# Patient Record
Sex: FEMALE | Race: ASIAN | NOT HISPANIC OR LATINO | Employment: FULL TIME | URBAN - METROPOLITAN AREA
[De-identification: names, ages, dates, MRNs, and addresses within clinical notes are randomized per-mention and may not be internally consistent; named-entity substitution may affect disease eponyms.]

---

## 2017-11-28 ENCOUNTER — OFFICE VISIT (OUTPATIENT)
Dept: PRIMARY CARE CLINIC | Facility: CLINIC | Age: 42
End: 2017-11-28
Payer: COMMERCIAL

## 2017-11-28 VITALS
HEART RATE: 78 BPM | DIASTOLIC BLOOD PRESSURE: 64 MMHG | WEIGHT: 112 LBS | HEIGHT: 60 IN | TEMPERATURE: 99 F | SYSTOLIC BLOOD PRESSURE: 118 MMHG | BODY MASS INDEX: 21.99 KG/M2

## 2017-11-28 DIAGNOSIS — R50.9 FEVER, LOW GRADE: ICD-10-CM

## 2017-11-28 DIAGNOSIS — R10.32 LEFT LOWER QUADRANT PAIN: ICD-10-CM

## 2017-11-28 DIAGNOSIS — R10.2 PELVIC PAIN IN FEMALE: Primary | ICD-10-CM

## 2017-11-28 PROCEDURE — 99203 OFFICE O/P NEW LOW 30 MIN: CPT | Mod: S$GLB,,, | Performed by: NURSE PRACTITIONER

## 2017-11-28 PROCEDURE — 99999 PR PBB SHADOW E&M-NEW PATIENT-LVL IV: CPT | Mod: PBBFAC,,, | Performed by: NURSE PRACTITIONER

## 2017-11-28 RX ORDER — MULTIVITAMIN
1 TABLET ORAL DAILY
COMMUNITY

## 2017-11-28 RX ORDER — ACETAMINOPHEN AND PHENYLEPHRINE HCL 325; 5 MG/1; MG/1
TABLET ORAL DAILY
COMMUNITY

## 2017-11-28 NOTE — PROGRESS NOTES
Subjective:       Patient ID: Yulisa Chandler is a 42 y.o. female.    Chief Complaint: Pelvic Pain (left week x 1 week)    Pelvic Pain   The patient's primary symptoms include pelvic pain (left lower ). The patient's pertinent negatives include no genital itching, genital lesions, genital odor, genital rash, missed menses, vaginal bleeding or vaginal discharge. This is a new problem. The current episode started 1 to 4 weeks ago. The problem occurs constantly. The problem has been gradually worsening. The pain is moderate. The problem affects the left side. She is not pregnant. Associated symptoms include abdominal pain (left lower quadrant abd and pelvis), a fever (low grade ) and frequency. Pertinent negatives include no anorexia, back pain, chills, constipation, diarrhea, discolored urine, dysuria, flank pain, headaches, hematuria, joint pain, joint swelling, nausea, painful intercourse, rash, sore throat, urgency or vomiting. The symptoms are aggravated by bowel movements. She has tried nothing for the symptoms. The treatment provided no relief. She is sexually active. No, her partner does not have an STD. She uses tubal ligation for contraception. Her menstrual history has been regular. Her past medical history is significant for a  section and miscarriage. There is no history of an ectopic pregnancy, endometriosis, a gynecological surgery, herpes simplex, menorrhagia, metrorrhagia, ovarian cysts, perineal abscess, an STD or vaginosis.     Review of Systems   Constitutional: Positive for fever (low grade ). Negative for chills, diaphoresis and fatigue.   HENT: Negative for sore throat.    Respiratory: Negative for cough, choking and shortness of breath.    Cardiovascular: Negative for chest pain and leg swelling.   Gastrointestinal: Positive for abdominal pain (left lower quadrant abd and pelvis). Negative for abdominal distention, anal bleeding, anorexia, blood in stool, constipation, diarrhea, nausea,  rectal pain and vomiting.   Genitourinary: Positive for frequency and pelvic pain (left lower ). Negative for decreased urine volume, difficulty urinating, dyspareunia, dysuria, flank pain, hematuria, menorrhagia, menstrual problem, missed menses, urgency, vaginal bleeding, vaginal discharge and vaginal pain.   Musculoskeletal: Negative for back pain and joint pain.   Skin: Negative for pallor and rash.   Neurological: Negative for headaches.   Hematological: Negative for adenopathy.   All other systems reviewed and are negative.      Objective:      Physical Exam   Constitutional: She is oriented to person, place, and time. She appears well-developed and well-nourished. No distress.   HENT:   Head: Normocephalic and atraumatic.   Mouth/Throat: Oropharynx is clear and moist.   Eyes: Conjunctivae are normal. Pupils are equal, round, and reactive to light. Right eye exhibits no discharge. Left eye exhibits no discharge. No scleral icterus.   Neck: Normal range of motion. Neck supple. No JVD present.   Cardiovascular: Normal rate, regular rhythm, normal heart sounds and intact distal pulses.    Pulmonary/Chest: Effort normal and breath sounds normal. No respiratory distress. She has no wheezes.   Abdominal: Soft. Bowel sounds are normal. She exhibits no distension and no mass. There is tenderness (c/o mild lower left quadrant pain with palpation ). There is no rebound and no guarding.   Musculoskeletal: Normal range of motion. She exhibits no edema.   Lymphadenopathy:     She has no cervical adenopathy.   Neurological: She is alert and oriented to person, place, and time.   Skin: Skin is warm and dry. Capillary refill takes less than 2 seconds. She is not diaphoretic.   Psychiatric: She has a normal mood and affect. Her behavior is normal.   Nursing note and vitals reviewed.      Assessment:       1. Pelvic pain in female    2. Left lower quadrant pain    3. Fever, low grade        Plan:       Pelvic pain in female  -      US Pelvis Complete Non OB; Future; Expected date: 11/28/2017  -     US Abdomen Complete; Future; Expected date: 11/28/2017  -     CBC auto differential; Future; Expected date: 11/28/2017  -     Comprehensive metabolic panel; Future; Expected date: 11/28/2017  -     POCT Urinalysis  -     POCT urine pregnancy    Left lower quadrant pain  -     US Pelvis Complete Non OB; Future; Expected date: 11/28/2017  -     US Abdomen Complete; Future; Expected date: 11/28/2017  -     CBC auto differential; Future; Expected date: 11/28/2017  -     Comprehensive metabolic panel; Future; Expected date: 11/28/2017  -     POCT Urinalysis  -     POCT urine pregnancy    Fever, low grade  -     US Pelvis Complete Non OB; Future; Expected date: 11/28/2017  -     US Abdomen Complete; Future; Expected date: 11/28/2017  -     CBC auto differential; Future; Expected date: 11/28/2017  -     Comprehensive metabolic panel; Future; Expected date: 11/28/2017    Differentials include ovarian cyst, constipation, and diverticular disease.     Medication List with Changes/Refills   Current Medications    BIOTIN 10,000 MCG CAP    Take by mouth once daily.    MULTIVITAMIN (ONE DAILY MULTIVITAMIN) PER TABLET    Take 1 tablet by mouth once daily.       Patient is to continue OTC NSAID as per label instructions for pain/fever.  She is to undergo labs and US and we will call her with results.  More recommendations based upon results.  If needed we will send her back to her GYN for further evaluation.  Encouraged her to increase fluids and rest today.  She will f/u in the next 1-2 weeks to undergo her annual physical.      I have reviewed the patient's past medical/surgical and social histories and updated as appropriate.    Plan of care was reviewed and agreed upon with the patient.  An opportunity to ask questions was provided and explanation given. Patient verbalized understanding on all information reviewed and discussed.  The patient will follow up  at her scheduled appointment to establish as primary care patient or sooner if needed. If symptoms worsen patient may call for ASAP appointment or report to the emergency department for further evaluation.

## 2017-11-28 NOTE — PATIENT INSTRUCTIONS
Pelvic Pain, Uncertain Cause    Pelvic pain is pain felt in the lowest part of the belly (abdomen) and between the hipbones. The pain may be acute. This means it occurred suddenly and recently. Or the pain may be chronic. This means it has occurred for 6 months or longer.  There are many possible causes of pelvic pain. The pain may be due to a problem in the female reproductive system (pictured here). Or, it may be due to a problem in the digestive, urinary, or musculoskeletal systems.  Based on your visit today, the exact cause of your pelvic pain is not certain. Your condition does not appear to be serious at this time. But it is important for you to keep watching for any new symptoms or worsening of your condition.  General care  Your healthcare provider may advise a number of ways to help manage your pain. These can include:  · Taking over-the-counter pain medicine. Stronger pain medicine may also be prescribed, if needed.  · Applying heat to the pelvic area. Use a heating pad or a hot pack. Taking a hot bath may also help.  · Getting plenty of rest.  · Making certain lifestyle changes. These can include practicing good posture and getting regular exercise. (Studies have shown that these changes help reduce pelvic pain in some women.)  · Seeing a physical therapist or pain specialist. These healthcare providers can discuss other ways to manage pain with you.  Follow-up care  Follow up with your healthcare provider, or as advised.   When to seek medical advice  Call your healthcare provider right away if any of the following occur:  · Fever of 100.4°F or higher, or as directed by your healthcare provider  · Pain worsens or you have sudden, severe pain or new pain  · Nausea, vomiting, sweating, or restlessness  · Dizziness or fainting  · Unusual vaginal discharge  · Abnormal vaginal bleeding (especially bleeding after menopause)  Date Last Reviewed: 6/11/2015  © 0843-3647 [employer]. [address] [place] [address]  [place] [address]. All rights reserved. This information is not intended as a substitute for professional medical care. Always follow your healthcare professional's instructions.

## 2017-11-29 ENCOUNTER — HOSPITAL ENCOUNTER (OUTPATIENT)
Dept: RADIOLOGY | Facility: HOSPITAL | Age: 42
Discharge: HOME | End: 2017-11-29
Attending: NURSE PRACTITIONER
Payer: COMMERCIAL

## 2017-11-29 DIAGNOSIS — R10.32 LEFT LOWER QUADRANT PAIN: ICD-10-CM

## 2017-11-29 DIAGNOSIS — R50.9 FEVER, LOW GRADE: ICD-10-CM

## 2017-11-29 DIAGNOSIS — R10.2 PELVIC PAIN IN FEMALE: ICD-10-CM

## 2017-11-29 PROCEDURE — 76700 US EXAM ABDOM COMPLETE: CPT | Mod: 26,,, | Performed by: RADIOLOGY

## 2017-11-29 PROCEDURE — 76856 US EXAM PELVIC COMPLETE: CPT | Mod: 26,,, | Performed by: RADIOLOGY

## 2017-11-29 PROCEDURE — 76856 US EXAM PELVIC COMPLETE: CPT | Mod: TC

## 2017-11-29 PROCEDURE — 76830 TRANSVAGINAL US NON-OB: CPT | Mod: 26,,, | Performed by: RADIOLOGY

## 2017-11-29 PROCEDURE — 76700 US EXAM ABDOM COMPLETE: CPT | Mod: TC

## 2017-11-30 ENCOUNTER — DOCUMENTATION ONLY (OUTPATIENT)
Dept: PRIMARY CARE CLINIC | Facility: CLINIC | Age: 42
End: 2017-11-30

## 2017-11-30 ENCOUNTER — TELEPHONE (OUTPATIENT)
Dept: PRIMARY CARE CLINIC | Facility: CLINIC | Age: 42
End: 2017-11-30

## 2017-11-30 NOTE — PROGRESS NOTES
Discussed US results with patient this morning.  Patient reports pain has decreased and at this time she is not experiencing any pain.  She did obtain Aleve and took it as directed with moderate benefit.  She did not undergo labs as ordered and would like to come to clinic for her annual exam and at that time have labs taken.  For now, we will continue monitor patient pain symptoms.  Pain likely memittelschmerz.

## 2019-01-25 DIAGNOSIS — Z12.39 BREAST CANCER SCREENING: ICD-10-CM

## 2019-01-29 ENCOUNTER — OFFICE VISIT (OUTPATIENT)
Dept: PRIMARY CARE CLINIC | Facility: CLINIC | Age: 44
End: 2019-01-29
Payer: COMMERCIAL

## 2019-01-29 VITALS
TEMPERATURE: 99 F | DIASTOLIC BLOOD PRESSURE: 74 MMHG | WEIGHT: 108.81 LBS | HEIGHT: 60 IN | BODY MASS INDEX: 21.36 KG/M2 | HEART RATE: 77 BPM | RESPIRATION RATE: 20 BRPM | OXYGEN SATURATION: 99 % | SYSTOLIC BLOOD PRESSURE: 114 MMHG

## 2019-01-29 DIAGNOSIS — Z23 NEED FOR TDAP VACCINATION: ICD-10-CM

## 2019-01-29 DIAGNOSIS — L65.9 THINNING HAIR: ICD-10-CM

## 2019-01-29 DIAGNOSIS — Z00.00 ANNUAL PHYSICAL EXAM: Primary | ICD-10-CM

## 2019-01-29 DIAGNOSIS — Z13.220 SCREENING FOR LIPID DISORDERS: ICD-10-CM

## 2019-01-29 DIAGNOSIS — Z23 NEED FOR PROPHYLACTIC VACCINATION AND INOCULATION AGAINST INFLUENZA: ICD-10-CM

## 2019-01-29 PROCEDURE — 99396 PR PREVENTIVE VISIT,EST,40-64: ICD-10-PCS | Mod: 25,S$GLB,, | Performed by: NURSE PRACTITIONER

## 2019-01-29 PROCEDURE — 90715 TDAP VACCINE 7 YRS/> IM: CPT | Mod: S$GLB,,, | Performed by: NURSE PRACTITIONER

## 2019-01-29 PROCEDURE — 90472 TDAP VACCINE GREATER THAN OR EQUAL TO 7YO IM: ICD-10-PCS | Mod: S$GLB,,, | Performed by: NURSE PRACTITIONER

## 2019-01-29 PROCEDURE — 99999 PR PBB SHADOW E&M-EST. PATIENT-LVL IV: ICD-10-PCS | Mod: PBBFAC,,, | Performed by: NURSE PRACTITIONER

## 2019-01-29 PROCEDURE — 90471 FLU VACCINE (QUAD) GREATER THAN OR EQUAL TO 3YO PRESERVATIVE FREE IM: ICD-10-PCS | Mod: S$GLB,,, | Performed by: NURSE PRACTITIONER

## 2019-01-29 PROCEDURE — 99999 PR PBB SHADOW E&M-EST. PATIENT-LVL IV: CPT | Mod: PBBFAC,,, | Performed by: NURSE PRACTITIONER

## 2019-01-29 PROCEDURE — 90472 IMMUNIZATION ADMIN EACH ADD: CPT | Mod: S$GLB,,, | Performed by: NURSE PRACTITIONER

## 2019-01-29 PROCEDURE — 90715 TDAP VACCINE GREATER THAN OR EQUAL TO 7YO IM: ICD-10-PCS | Mod: S$GLB,,, | Performed by: NURSE PRACTITIONER

## 2019-01-29 PROCEDURE — 90471 IMMUNIZATION ADMIN: CPT | Mod: S$GLB,,, | Performed by: NURSE PRACTITIONER

## 2019-01-29 PROCEDURE — 90686 IIV4 VACC NO PRSV 0.5 ML IM: CPT | Mod: S$GLB,,, | Performed by: NURSE PRACTITIONER

## 2019-01-29 PROCEDURE — 90686 FLU VACCINE (QUAD) GREATER THAN OR EQUAL TO 3YO PRESERVATIVE FREE IM: ICD-10-PCS | Mod: S$GLB,,, | Performed by: NURSE PRACTITIONER

## 2019-01-29 PROCEDURE — 99396 PREV VISIT EST AGE 40-64: CPT | Mod: 25,S$GLB,, | Performed by: NURSE PRACTITIONER

## 2019-01-29 NOTE — PROGRESS NOTES
Pt ID verified by  and Name. Allergies verified with pt. Pt reports has had flu vaccine in the past and denies any reactions. Influenza 0.5mL vaccine administered IM to R Deltoid and Tdap 0.5mL vaccine administered IM to L Deltoid. Pt tolerated well. No adverse reactions noted.

## 2019-01-29 NOTE — PROGRESS NOTES
Subjective:       Patient ID: Yulisa Chandler is a 43 y.o. female.    Chief Complaint: Annual Exam    Patient presents to clinic today for annual physical exam. She has no acute complaints.  She is due for labs and mammogram.  She is due for pap. She is followed by Dr. Karma Stallworth, GYN in [place].  She does attend routine eye and dental exams.  She stays active with 4 children and tries to eat healthy.  She works for [employer].  She does take biotin for ongoing hair thinning since the birth of her children.  She is due for immunization updating.  She does perform self breast exam at home.        Review of Systems   Constitutional: Negative.    HENT: Negative.    Eyes: Negative.    Respiratory: Negative.    Cardiovascular: Negative.    Gastrointestinal: Negative.    Endocrine: Negative.    Genitourinary: Negative.    Musculoskeletal: Negative.    Skin: Negative.    Allergic/Immunologic: Negative.    Neurological: Negative.    Hematological: Negative.    Psychiatric/Behavioral: Negative.    All other systems reviewed and are negative.      Objective:      Physical Exam   Constitutional: She is oriented to person, place, and time. She appears well-developed and well-nourished. No distress.   HENT:   Head: Normocephalic and atraumatic.   Right Ear: External ear normal.   Left Ear: External ear normal.   Nose: Nose normal.   Mouth/Throat: Oropharynx is clear and moist. No oropharyngeal exudate.   Eyes: Conjunctivae and EOM are normal. Pupils are equal, round, and reactive to light. Right eye exhibits no discharge. Left eye exhibits no discharge. No scleral icterus.   Neck: Normal range of motion. Neck supple. No thyromegaly present.   Cardiovascular: Normal rate, regular rhythm, normal heart sounds and intact distal pulses. Exam reveals no gallop and no friction rub.   No murmur heard.  Pulmonary/Chest: Effort normal and breath sounds normal. No respiratory distress.   Abdominal: Soft. Bowel sounds are normal.  She exhibits no distension. There is no tenderness.   Musculoskeletal: Normal range of motion. She exhibits no edema.   Lymphadenopathy:     She has no cervical adenopathy.   Neurological: She is alert and oriented to person, place, and time. No cranial nerve deficit. Coordination normal.   Skin: Skin is warm and dry. Capillary refill takes less than 2 seconds. No rash noted. She is not diaphoretic. No erythema.   Psychiatric: She has a normal mood and affect. Her behavior is normal. Thought content normal.   Nursing note and vitals reviewed.      Assessment:       1. Annual physical exam    2. Screening for lipid disorders    3. Thinning hair    4. Need for prophylactic vaccination and inoculation against influenza    5. Need for Tdap vaccination        Plan:       Annual physical exam  -     CBC auto differential; Future; Expected date: 01/29/2019  -     Comprehensive metabolic panel; Future; Expected date: 01/29/2019  -     Lipid panel; Future; Expected date: 01/29/2019  -     TSH; Future; Expected date: 01/29/2019  -     Urinalysis; Future; Expected date: 01/29/2019  -     (In Office Administered) Tdap Vaccine  -     Influenza - Quadrivalent (3 years & older) (PF)    Screening for lipid disorders  -     CBC auto differential; Future; Expected date: 01/29/2019  -     Comprehensive metabolic panel; Future; Expected date: 01/29/2019  -     Lipid panel; Future; Expected date: 01/29/2019  -     TSH; Future; Expected date: 01/29/2019  -     Urinalysis; Future; Expected date: 01/29/2019    Thinning hair  -     CBC auto differential; Future; Expected date: 01/29/2019  -     Comprehensive metabolic panel; Future; Expected date: 01/29/2019  -     Lipid panel; Future; Expected date: 01/29/2019  -     TSH; Future; Expected date: 01/29/2019  -     Urinalysis; Future; Expected date: 01/29/2019    Need for prophylactic vaccination and inoculation against influenza  -     Cancel: Flu Vaccine - Quadrivalent (PF) (3 years &  older)  -     Influenza - Quadrivalent (3 years & older) (PF)    Need for Tdap vaccination  -     (In Office Administered) Tdap Vaccine         Medication List           Accurate as of 1/29/19 11:52 AM. If you have any questions, ask your nurse or doctor.               CONTINUE taking these medications    biotin 10,000 mcg Cap     ONE DAILY MULTIVITAMIN per tablet  Generic drug:  multivitamin              I have reviewed the patient's past medical/surgical and social histories and updated as appropriate. Medications were reviewed and discussed as appropriate including side effects and risks versus benefit. Plan of care was reviewed and agreed upon with the patient.  An opportunity to ask questions was provided and explanation given. Patient verbalized understanding on all information reviewed and discussed. If any abnormal labs we will call her with them.  She is to follow up as needed and in one year for annual exam.

## 2019-01-29 NOTE — PATIENT INSTRUCTIONS
Adult Immunization Schedule  Vaccine How Often Disease Prevented Who Needs It   Influenza Every year Flu. This can be especially dangerous to elderly adults or people with immune disorders. All adults   Tetanus, diphtheria (Td); or Tetanus, diphtheria, and pertussis (Tdap)* One dose of Tdap, then one dose of Td as a booster every 10 years Tetanus (lockjaw), a disease that causes muscles to spasm  Diphtheria, an infection that causes fever, weakness, and breathing problems  Pertussis, also known as whooping cough. This is a highly contagious disease that can cause serious illness. All adults  *This vaccine should be given during each pregnancy no matter how many years since the last vaccine. The vaccine increases protection for your . A  is too young to get the vaccine, but at the highest risk for severe illness and death from pertussis.   Varicella (Kelvin)** One series of 2 injections Chickenpox. This is a disease that causes itchy skin bumps, fever, and tiredness. It can lead to scarring, pneumonia, or brain inflammation. Adults who dont have evidence of immunity  **This vaccine should not be given to pregnant women. Women should avoid pregnancy for 4 weeks after the vaccine.   Human papillomavirus (HPV) One series of 3 injections Cervical cancer, caused by certain types of HPV  Vaginal and vulvar cancer  Penile cancer  Head and neck cancers  Anal cancer  Genital warts Females and males ages 15 to 26  Ask your healthcare provider if this vaccine is right for you.   Zoster--- 1 dose Herpes zoster (shingles), a painful rash marked by blisters Adults ages 60 and older.  ---You should not get this vaccine if your immune system is low. For example, if you have HIV, are taking medicines that suppress your immune system, or are getting cancer treatment.   Measles, mumps, rubella (MMR)** 1 or 2 doses, for ages 19 through 49; 1 dose for ages 50 and older if at risk Measles, a disease marked by red spots,  fever, and coughing  Mumps, a disease that causes swelling in the salivary glands. It may affect the ovaries or testes.  Rubella ([place] measles). This is a form of measles that can cause birth defects if a pregnant woman catches it. Adults born in 1957 or later who are not known to be immune to all 3 of these diseases. Ask your healthcare provider if you need a second dose.  **This vaccine should not be given to pregnant women. Women should avoid pregnancy for 4 weeks after vaccination.   Pneumococcal (PCV 13) 1 dose Pneumonia. This is an infection that causes inflammation in your lungs. It can lead to death. Adults ages 65 and older. Also, adults ages 19 and older with weak immune systems or any of these health conditions: chronic renal failure, nephrotic syndrome, functional or anatomic asplenia, cerebrospinal fluid (CSF) leaks, or cochlear implants. This vaccine adds extra protection to PCV 23 and should be given about 2 months before PCV 23.   Pneumococcal (PPSV23)  1 or 2 doses Pneumonia. This is an infection that causes inflammation in your lungs. It can lead to death. Adults ages 65 and older. Also, adults with chronic illnesses, such as asthma, COPD, heart disease, diabetes, liver disease, alcoholism, sickle cell disease, or history of splenectomy. In addition, adults with an immune disorder and adults who smoke cigarettes. This vaccine is recommended for all adults regardless of immune status.   Meningococcal  (MCV or MPSV) 1 or more doses Meningococcal disease (bacterial meningitis). This is inflammation of the membrane covering the brain and spinal cord. It can lead to death. Adults with immune deficiencies or high risk of exposure. Also, college freshmen living in dormitories and  recruits.   Hepatitis A (HepA) One series of 2 injections Hepatitis A. This is an infection that can result in acute liver inflammation and yellow skin and whites of the eyes (jaundice). Adults with risk factors, such  as clotting disorders or chronic liver disease, and adults with high risk of exposure. This includes men who have sex with men, IV drug users, and travelers to countries where hepatitis A is common.   Hepatitis B (HepB) One series of 3 injections Hepatitis B. This is an infection that causes chronic, severe liver disease. Adults with high risk of exposure, such as healthcare providers and sanitation workers, and adults with diabetes   Travelers diseases As needed Infections such as cholera, typhoid, yellow fever, polio, rabies, meningococcal disease, hepatitis A, hepatitis B Adults traveling out of the country. Required vaccines will vary, depending on the country you visit. Check the CDC website: www.cdc.gov.    ,  Based on the CDC National Immunization Program recommendations for adults.  Date Last Reviewed: 2/1/2017  © 6229-9472 [employer]. [address] [place] [address], [place] [address]. All rights reserved. This information is not intended as a substitute for professional medical care. Always follow your healthcare professional's instructions.

## 2019-04-25 ENCOUNTER — TELEPHONE (OUTPATIENT)
Dept: ADMINISTRATIVE | Facility: HOSPITAL | Age: 44
End: 2019-04-25

## 2019-04-25 NOTE — LETTER
April 25, 2019    Dr. Olivia Snow and staff              [facility]   [address] [address]  P: [phone]  F: [phone] April 25, 2019     Patient: Yulisa Chandler    YOB: 1975   Date of Visit: 4/25/2019         [facility]    We are seeing Yulisa Chandler in the clinic today at [facility].  Luisa Stovall, ENMANUEL, NP-C is their PCP.  She/He has an outstanding lab/procedure at this time when reviewing their chart.  To help with our Health Maintenance records will you please supply the following:      [x]  Mammogram                          []  Colonoscopy   [x]  Pap Smear                             [x]  Outside Lab Results   []  Dexa scans                            []  Eye Exam   []  Foot Exam                             [] Other___________   []  Outside Immunizations                                               Please Fax to [facility] at [phone].    Thank you for your help, BENEDICT Kellogg.  If I can be of any assistance you can call at [phone]

## 2019-04-25 NOTE — TELEPHONE ENCOUNTER
Placed call to patient to discuss overdue HM. Patient advised that she has not had a chance to have a recent PAP Smear and Mammogram. EFAX sent to Dr. Stallworth to obtain most recent records.

## 2019-05-06 PROBLEM — Z00.00 ANNUAL PHYSICAL EXAM: Status: RESOLVED | Noted: 2019-01-29 | Resolved: 2019-05-06

## 2019-08-26 ENCOUNTER — OFFICE VISIT (OUTPATIENT)
Dept: PRIMARY CARE CLINIC | Facility: CLINIC | Age: 44
End: 2019-08-26
Attending: NURSE PRACTITIONER
Payer: COMMERCIAL

## 2019-08-26 VITALS
RESPIRATION RATE: 18 BRPM | DIASTOLIC BLOOD PRESSURE: 59 MMHG | WEIGHT: 109.56 LBS | BODY MASS INDEX: 21.51 KG/M2 | SYSTOLIC BLOOD PRESSURE: 116 MMHG | HEART RATE: 89 BPM | HEIGHT: 60 IN | OXYGEN SATURATION: 99 % | TEMPERATURE: 99 F

## 2019-08-26 DIAGNOSIS — R10.9 FLANK PAIN: ICD-10-CM

## 2019-08-26 DIAGNOSIS — M54.50 ACUTE BILATERAL LOW BACK PAIN WITHOUT SCIATICA: Primary | ICD-10-CM

## 2019-08-26 DIAGNOSIS — K59.00 CONSTIPATION, UNSPECIFIED CONSTIPATION TYPE: ICD-10-CM

## 2019-08-26 DIAGNOSIS — R31.9 HEMATURIA, UNSPECIFIED TYPE: ICD-10-CM

## 2019-08-26 DIAGNOSIS — R14.0 ABDOMINAL BLOATING: ICD-10-CM

## 2019-08-26 LAB
B-HCG UR QL: NEGATIVE
BILIRUB SERPL-MCNC: 0 MG/DL
BLOOD, POC UA: NORMAL
CTP QC/QA: YES
GLUCOSE UR QL STRIP: NORMAL
KETONES UR QL STRIP: 0
LEUKOCYTE ESTERASE URINE, POC: 0
NITRITE, POC UA: 0
PH, POC UA: 5
PROTEIN, POC: 0
SPECIFIC GRAVITY, POC UA: 1.01
UROBILINOGEN, POC UA: NORMAL

## 2019-08-26 PROCEDURE — 99999 PR PBB SHADOW E&M-EST. PATIENT-LVL IV: CPT | Mod: PBBFAC,,, | Performed by: NURSE PRACTITIONER

## 2019-08-26 PROCEDURE — 99214 PR OFFICE/OUTPT VISIT, EST, LEVL IV, 30-39 MIN: ICD-10-PCS | Mod: 25,S$GLB,, | Performed by: NURSE PRACTITIONER

## 2019-08-26 PROCEDURE — 81000 POCT URINALYSIS: ICD-10-PCS | Mod: S$GLB,,, | Performed by: NURSE PRACTITIONER

## 2019-08-26 PROCEDURE — 3008F PR BODY MASS INDEX (BMI) DOCUMENTED: ICD-10-PCS | Mod: CPTII,S$GLB,, | Performed by: NURSE PRACTITIONER

## 2019-08-26 PROCEDURE — 81000 URINALYSIS NONAUTO W/SCOPE: CPT | Mod: S$GLB,,, | Performed by: NURSE PRACTITIONER

## 2019-08-26 PROCEDURE — 99999 PR PBB SHADOW E&M-EST. PATIENT-LVL IV: ICD-10-PCS | Mod: PBBFAC,,, | Performed by: NURSE PRACTITIONER

## 2019-08-26 PROCEDURE — 81025 POCT URINE PREGNANCY: ICD-10-PCS | Mod: S$GLB,,, | Performed by: NURSE PRACTITIONER

## 2019-08-26 PROCEDURE — 3008F BODY MASS INDEX DOCD: CPT | Mod: CPTII,S$GLB,, | Performed by: NURSE PRACTITIONER

## 2019-08-26 PROCEDURE — 81001 URINALYSIS AUTO W/SCOPE: CPT

## 2019-08-26 PROCEDURE — 99214 OFFICE O/P EST MOD 30 MIN: CPT | Mod: 25,S$GLB,, | Performed by: NURSE PRACTITIONER

## 2019-08-26 PROCEDURE — 81025 URINE PREGNANCY TEST: CPT | Mod: S$GLB,,, | Performed by: NURSE PRACTITIONER

## 2019-08-26 RX ORDER — POLYETHYLENE GLYCOL 3350 17 G/17G
17 POWDER, FOR SOLUTION ORAL DAILY
Refills: 0 | COMMUNITY
Start: 2019-08-26 | End: 2020-12-15

## 2019-08-26 RX ORDER — TIZANIDINE 4 MG/1
4 TABLET ORAL EVERY 6 HOURS PRN
Qty: 21 TABLET | Refills: 0 | Status: SHIPPED | OUTPATIENT
Start: 2019-08-26 | End: 2020-12-15

## 2019-08-26 NOTE — PATIENT INSTRUCTIONS
Constipation (Adult)  Constipation means that you have bowel movements that are less frequent than usual. Stools often become very hard and difficult to pass.  Constipation is very common. At some point in life it affects almost everyone. Since everyone's bowel habits are different, what is constipation to one person may not be to another. Your healthcare provider may do tests to diagnose constipation. It depends on what he or she finds when evaluating you.    Symptoms of constipation include:  · Abdominal pain  · Bloating  · Vomiting  · Painful bowel movements  · Itching, swelling, bleeding, or pain around the anus  Causes  Constipation can have many causes. These include:  · Diet low in fiber  · Too much dairy  · Not drinking enough liquids  · Lack of exercise or physical activity. This is especially true for older adults.  · Changes in lifestyle or daily routine, including pregnancy, aging, work, and travel  · Frequent use or misuse of laxatives  · Ignoring the urge to have a bowel movement or delaying it until later  · Medicines, such as certain prescription pain medicines, iron supplements, antacids, certain antidepressants, and calcium supplements  · Diseases like irritable bowel syndrome, bowel obstructions, stroke, diabetes, thyroid disease, Parkinson disease, hemorrhoids, and colon cancer  Complications  Potential complications of constipation can include:  · Hemorrhoids  · Rectal bleeding from hemorrhoids or anal fissures (skin tears)  · Hernias  · Dependency on laxatives  · Chronic constipation  · Fecal impaction  · Bowel obstruction or perforation  Home care  All treatment should be done after talking with your healthcare provider. This is especially true if you have another medical problems, are taking prescription medicines, or are an older adult. Treatment most often involves lifestyle changes. You may also need medicines. Your healthcare provider will tell you which will work best for you. Follow  the advice below to help avoid this problem in the future.  Lifestyle changes  These lifestyle changes can help prevent constipation:  · Diet. Eat a high-fiber diet, with fresh fruit and vegetables, and reduce dairy intake, meats, and processed foods  · Fluids. It's important to get enough fluids each day. Drink plenty of water when you eat more fiber. If you are on diet that limits the amount of fluid you can have, talk about this with your healthcare provider.  · Regular exercise. Check with your healthcare provider first.  Medications  Take any medicines as directed. Some laxatives are safe to use only every now and then. Others can be taken on a regular basis. Talk with your doctor or pharmacist if you have questions.  Prescription pain medicines can cause constipation. If you are taking this kind of medicine, ask your healthcare provider if you should also take a stool softener.  Medicines you may take to treat constipation include:  · Fiber supplements  · Stool softeners  · Laxatives  · Enemas  · Rectal suppositories  Follow-up care  Follow up with your healthcare provider if symptoms don't get better in the next few days. You may need to have more tests or see a specialist.  Call 911  Call 911 if any of these occur:  · Trouble breathing  · Stiff, rigid abdomen that is severely painful to touch  · Confusion  · Fainting or loss of consciousness  · Rapid heart rate  · Chest pain  When to seek medical advice  Call your healthcare provider right away if any of these occur:  · Fever over 100.4°F (38°C)  · Failure to resume normal bowel movements  · Pain in your abdomen or back gets worse  · Nausea or vomiting  · Swelling in your abdomen  · Blood in the stool  · Black, tarry stool  · Involuntary weight loss  · Weakness  Date Last Reviewed: 12/30/2015  © 9902-4059 [employer]. [address] [place] [address], [place] [address]. All rights reserved. This information is not intended as a substitute for  professional medical care. Always follow your healthcare professional's instructions.        Identifying Kidney Stones  There are 4 general types of kidney stones. Your kidney stones size and shape determine whether it is likely to pass by itself. Knowing what a stone is made of (its composition) helps your healthcare provider find its cause. Then he or she can suggest the best treatment. X-rays or scans can help show the stone's size and shape. Your healthcare provider may also give you a strainer. You can use this to catch the stone while passing urine, and the provider can then test the stone. Other urine and blood tests may be done to help identify the stone. These tests can also help identify causes for different types of stones.     Size  A stone may be as small as a grain of sand. Or it may be as large as a golf ball. Small stones may pass out of your body when you urinate.   Shape  Small, smooth, round stones may pass easily. Jagged-edged stones often lodge inside the kidney or ureter. Staghorn stones can fill the entire renal pelvis and calyces.   Composition  Most stones are made of calcium oxalate, a hard compound. Stones made of cystine or uric acid, or caused by infection (struvite stones), are less dense. Stones often contain more than one chemical.      Your kidneys filter your blood and release chemicals into the urine. If certain chemicals build up in the kidneys, they can form a stone.   Treating your stones  You and your healthcare provider will work together to form a treatment plan. Your provider may suggest that you let your stone pass naturally. Or you may manage it with medicines. Certain procedures may also help, such as SWL (shock wave lithotripsy) or using a camera inside the body to remove the stone (ureteroscopy). And you will be told how you can help prevent kidney stones in the future.  Date Last Reviewed: 1/1/2017  © 2159-6819 [employer]. [address] [place] [address],  [place] [address]. All rights reserved. This information is not intended as a substitute for professional medical care. Always follow your healthcare professional's instructions.
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
## 2019-08-27 ENCOUNTER — TELEPHONE (OUTPATIENT)
Dept: PRIMARY CARE CLINIC | Facility: CLINIC | Age: 44
End: 2019-08-27

## 2019-08-27 DIAGNOSIS — N20.0 NEPHROLITHIASIS: Primary | ICD-10-CM

## 2019-08-27 LAB
BACTERIA #/AREA URNS AUTO: NORMAL /HPF
BILIRUB UR QL STRIP: NEGATIVE
CLARITY UR REFRACT.AUTO: CLEAR
COLOR UR AUTO: YELLOW
GLUCOSE UR QL STRIP: NEGATIVE
HGB UR QL STRIP: ABNORMAL
KETONES UR QL STRIP: NEGATIVE
LEUKOCYTE ESTERASE UR QL STRIP: NEGATIVE
MICROSCOPIC COMMENT: NORMAL
NITRITE UR QL STRIP: NEGATIVE
PH UR STRIP: 6 [PH] (ref 5–8)
PROT UR QL STRIP: NEGATIVE
RBC #/AREA URNS AUTO: 0 /HPF (ref 0–4)
SP GR UR STRIP: 1.01 (ref 1–1.03)
SQUAMOUS #/AREA URNS AUTO: 4 /HPF
URN SPEC COLLECT METH UR: ABNORMAL
WBC #/AREA URNS AUTO: 1 /HPF (ref 0–5)

## 2019-08-27 NOTE — TELEPHONE ENCOUNTER
Patient called with results of CT.  There is a 5.6mm stone in urinary bladder.  Pt. To continue increasing fluids.  Will put in referral for urology to see patient.  Office staff to facilitate an appointment.

## 2019-08-28 ENCOUNTER — OFFICE VISIT (OUTPATIENT)
Dept: UROLOGY | Facility: CLINIC | Age: 44
End: 2019-08-28
Payer: COMMERCIAL

## 2019-08-28 VITALS
TEMPERATURE: 98 F | HEART RATE: 82 BPM | BODY MASS INDEX: 21.51 KG/M2 | RESPIRATION RATE: 18 BRPM | WEIGHT: 109.56 LBS | SYSTOLIC BLOOD PRESSURE: 112 MMHG | DIASTOLIC BLOOD PRESSURE: 75 MMHG | HEIGHT: 60 IN

## 2019-08-28 DIAGNOSIS — R10.9 FLANK PAIN: ICD-10-CM

## 2019-08-28 DIAGNOSIS — N20.0 NEPHROLITHIASIS: ICD-10-CM

## 2019-08-28 DIAGNOSIS — M54.9 BACK PAIN, UNSPECIFIED BACK LOCATION, UNSPECIFIED BACK PAIN LATERALITY, UNSPECIFIED CHRONICITY: Primary | ICD-10-CM

## 2019-08-28 PROCEDURE — 99204 OFFICE O/P NEW MOD 45 MIN: CPT | Mod: S$GLB,,, | Performed by: NURSE PRACTITIONER

## 2019-08-28 PROCEDURE — 3008F PR BODY MASS INDEX (BMI) DOCUMENTED: ICD-10-PCS | Mod: CPTII,S$GLB,, | Performed by: NURSE PRACTITIONER

## 2019-08-28 PROCEDURE — 99999 PR PBB SHADOW E&M-EST. PATIENT-LVL IV: CPT | Mod: PBBFAC,,, | Performed by: NURSE PRACTITIONER

## 2019-08-28 PROCEDURE — 99204 PR OFFICE/OUTPT VISIT, NEW, LEVL IV, 45-59 MIN: ICD-10-PCS | Mod: S$GLB,,, | Performed by: NURSE PRACTITIONER

## 2019-08-28 PROCEDURE — 99999 PR PBB SHADOW E&M-EST. PATIENT-LVL IV: ICD-10-PCS | Mod: PBBFAC,,, | Performed by: NURSE PRACTITIONER

## 2019-08-28 PROCEDURE — 3008F BODY MASS INDEX DOCD: CPT | Mod: CPTII,S$GLB,, | Performed by: NURSE PRACTITIONER

## 2019-08-28 RX ORDER — TAMSULOSIN HYDROCHLORIDE 0.4 MG/1
0.4 CAPSULE ORAL DAILY
Qty: 30 CAPSULE | Refills: 3 | Status: SHIPPED | OUTPATIENT
Start: 2019-08-28 | End: 2020-12-15
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
## 2019-08-28 NOTE — PATIENT INSTRUCTIONS
Understanding Kidney Stones  Your kidneys are bean-shaped organs. They help filter extra salts, waste, and water from your body. You need to drink enough water every day to help flush the extra salts into your urine.     What are kidney stones?  Kidney stones are made up of chemical crystals that separate out from urine. These crystals clump together to make stones. They form in the calyx of the kidney. They may stay in the kidney or move into the urinary tract.   Why kidney stones form  Kidneys form stones for many reasons. If you dont drink enough water, for instance, you wont have enough urine to dilute chemicals. Then the chemicals may form crystals, which can develop into stones. Here are some reasons why kidney stones form:  · Fluid loss (dehydration). This can concentrate urine, causing stones to form.  · Certain foods. Some foods contain large amounts of the chemicals that sometimes crystallize into stones. Eating foods that contain a lot of meat or salt can lead to more kidney stones.  · Kidney infections. These infections foster stones by slowing urine flow or changing the acid balance of your urine.  · Family history. If family members have had kidney stones, youre more likely to have them, too.  · A lack of certain substances in your urine. Some substances can help protect you from forming stones. If you dont have enough of these in your urine, stone formation can increase.  Where stones form  Stones begin in the cup-shaped part of the kidney (calyx). Some stay in the calyx and grow. Others move into the kidney, pelvis, or into the ureter. There they can lodge, block the flow of urine, and cause pain.  Symptoms  Many stones cause sudden and severe pain and bloody urine. Others cause nausea or frequent, burning urination. Symptoms often depend on your stones size and location. Fever may indicate a serious infection. Call your healthcare provider right away if you develop a fever.  Date Last  Reviewed: 1/1/2017 © 2000-2017 [employer]. [address] [place] [address], [place] [address]. All rights reserved. This information is not intended as a substitute for professional medical care. Always follow your healthcare professional's instructions.        Identifying Kidney Stones  There are 4 general types of kidney stones. Your kidney stones size and shape determine whether it is likely to pass by itself. Knowing what a stone is made of (its composition) helps your healthcare provider find its cause. Then he or she can suggest the best treatment. X-rays or scans can help show the stone's size and shape. Your healthcare provider may also give you a strainer. You can use this to catch the stone while passing urine, and the provider can then test the stone. Other urine and blood tests may be done to help identify the stone. These tests can also help identify causes for different types of stones.     Size  A stone may be as small as a grain of sand. Or it may be as large as a golf ball. Small stones may pass out of your body when you urinate.   Shape  Small, smooth, round stones may pass easily. Jagged-edged stones often lodge inside the kidney or ureter. Staghorn stones can fill the entire renal pelvis and calyces.   Composition  Most stones are made of calcium oxalate, a hard compound. Stones made of cystine or uric acid, or caused by infection (struvite stones), are less dense. Stones often contain more than one chemical.      Your kidneys filter your blood and release chemicals into the urine. If certain chemicals build up in the kidneys, they can form a stone.   Treating your stones  You and your healthcare provider will work together to form a treatment plan. Your provider may suggest that you let your stone pass naturally. Or you may manage it with medicines. Certain procedures may also help, such as SWL (shock wave lithotripsy) or using a camera inside the body to remove the stone (ureteroscopy).  And you will be told how you can help prevent kidney stones in the future.  Date Last Reviewed: 1/1/2017  © 9115-9372 [employer]. [address] [place] [address], [place] [address]. All rights reserved. This information is not intended as a substitute for professional medical care. Always follow your healthcare professional's instructions.        Preventing Kidney Stones  If youve had a kidney stone, you may worry that youll have another. Removing or passing your stone doesnt prevent future stones. But with your healthcare providers help, you can reduce your risk of forming new stones. Follow up with your healthcare provider to help find new stones. You may need follow-up every 3 months to a year for a lifetime.    Drink lots of water  Staying well-hydrated is the best way to reduce your risk of future stones. Drink 8 12-ounce glasses of water daily. Have 2 with each meal and 2 between meals. Try keeping a pitcher of water nearby during the day and at night.  Take medicines if needed  Medicines, including vitamins and minerals, may be prescribed for certain types of stones. You may want to write your doses and medicine times on a calendar. Some medicines decrease stone-forming chemicals in your blood. Others help prevent those chemicals from crystallizing in urine. Still others help keep a normal acid balance in your urine.  Follow your prescribed diet  Your healthcare provider will tell you which foods contain the chemicals you should avoid. Your healthcare provider may also suggest talking to a dietitian. He or she can help you plan meals youll enjoy. These meals wont put you at risk for future stones. You may be told to limit certain foods, depending on which type of stones youve had. You should limit the amount of salt in your food to about 2 grams a day. This will help prevent most types of kidney stones. Make sure you get an adequate amount of calcium in your diet.  For calcium oxalate  stones: Limit animal protein, such as meat, eggs, and fish. Limit grapefruit juice and alcohol. Limit high-oxalate foods (such as cola, tea, chocolate, spinach, rhubarb, wheat bran, and peanuts).  For uric acid stones: Limit high-purine foods, such as mushrooms, peas, beans, anchovies, meat, poultry, shellfish, and organ meats. These foods increase uric acid production.  For cystine stones: Limit high-methionine foods (fish is the most common, but eggs and meats, also). These foods increase production of cystine.  Date Last Reviewed: 2/1/2017  © 7803-4702 [employer]. [address] [place] [address], [place] [address]. All rights reserved. This information is not intended as a substitute for professional medical care. Always follow your healthcare professional's instructions.

## 2019-08-28 NOTE — LETTER
August 28, 2019      Luisa Stovall, DNP, NP-C  [address]  [address]  [facility] [address]           [facility]  [address] [place] [address] [address]  [facility] [address]  Phone: [phone]  Fax: [phone]          Patient: Yulisa [place]   MR Number: [id number]   YOB: 1975   Date of Visit: 8/28/2019       Dear Luisa Stovall:    Thank you for referring Yulisa [place] to me for evaluation. Attached you will find relevant portions of my assessment and plan of care.    If you have questions, please do not hesitate to call me. I look forward to following Yulisa [place] along with you.    Sincerely,    Ethel Capps, [facility]    Enclosure  CC:  No Recipients    If you would like to receive this communication electronically, please contact [email or web address] or [phone] to request more information on [facility] app Link access.    For providers and/or their staff who would like to refer a patient to [facility], please contact us through our one-stop-shop provider referral line, [facility], at [phone].    If you feel you have received this communication in error or would no longer like to receive these types of communications, please e-mail [email or web address]

## 2019-08-29 ENCOUNTER — TELEPHONE (OUTPATIENT)
Dept: PRIMARY CARE CLINIC | Facility: CLINIC | Age: 44
End: 2019-08-29

## 2019-08-29 DIAGNOSIS — R92.8 ABNORMALITY OF LEFT BREAST ON SCREENING MAMMOGRAM: ICD-10-CM

## 2019-08-29 DIAGNOSIS — N63.20 BREAST MASS, LEFT: ICD-10-CM

## 2019-08-29 PROBLEM — N20.0 RENAL STONE: Status: ACTIVE | Noted: 2019-08-29

## 2019-08-29 NOTE — TELEPHONE ENCOUNTER
Spoke with pt and gave results and recommendations. States she will get a copy of the mammo from [facility] and will go to breast center for f/u.

## 2019-08-29 NOTE — TELEPHONE ENCOUNTER
----- Message from Luisa Stovall DNP, NP-C sent at 8/29/2019 10:07 AM CDT -----  Please call patient and let her know there is an abnormality noted on her mammogram in the left breast.  This needs a mammogram - diagnostic with compression views and an ultrasound.  It is important we get this done ASAP.  I would prefer she go to the [facility] breast center can you ask her to obtain a copy of her mammogram from [facility] and allow me to send her to the breast center.  Please let me know and I will place orders.

## 2019-08-29 NOTE — TELEPHONE ENCOUNTER
----- Message from Luisa Stovall DNP, NP-C sent at 8/29/2019 10:07 AM CDT -----  Please call patient and let her know there is an abnormality noted on her mammogram in the left breast.  This needs a mammogram - diagnostic with compression views and an ultrasound.  It is important we get this done ASAP.  I would prefer she go to the [facility] breast center can you ask her to obtain a copy of her mammogram from [facility] and allow me to send her to the breast center.  Please let me know and I will place orders.

## 2019-08-31 LAB
ALBUMIN SERPL-MCNC: 4.4 G/DL (ref 3.6–5.1)
ALBUMIN/GLOB SERPL: 1.6 (CALC) (ref 1–2.5)
ALP SERPL-CCNC: 37 U/L (ref 33–115)
ALT SERPL-CCNC: 10 U/L (ref 6–29)
AST SERPL-CCNC: 14 U/L (ref 10–30)
BASOPHILS # BLD AUTO: 48 CELLS/UL (ref 0–200)
BASOPHILS NFR BLD AUTO: 0.9 %
BILIRUB SERPL-MCNC: 0.4 MG/DL (ref 0.2–1.2)
BUN SERPL-MCNC: 11 MG/DL (ref 7–25)
BUN/CREAT SERPL: NORMAL (CALC) (ref 6–22)
CALCIUM SERPL-MCNC: 9.6 MG/DL (ref 8.6–10.2)
CHLORIDE SERPL-SCNC: 105 MMOL/L (ref 98–110)
CHOLEST SERPL-MCNC: 175 MG/DL
CHOLEST/HDLC SERPL: 2.8 (CALC)
CO2 SERPL-SCNC: 27 MMOL/L (ref 20–32)
CREAT SERPL-MCNC: 0.73 MG/DL (ref 0.5–1.1)
EOSINOPHIL # BLD AUTO: 122 CELLS/UL (ref 15–500)
EOSINOPHIL NFR BLD AUTO: 2.3 %
ERYTHROCYTE [DISTWIDTH] IN BLOOD BY AUTOMATED COUNT: 12.3 % (ref 11–15)
GFRSERPLBLD MDRD-ARVRAT: 100 ML/MIN/1.73M2
GLOBULIN SER CALC-MCNC: 2.7 G/DL (CALC) (ref 1.9–3.7)
GLUCOSE SERPL-MCNC: 93 MG/DL (ref 65–99)
HCT VFR BLD AUTO: 38.5 % (ref 35–45)
HDLC SERPL-MCNC: 62 MG/DL
HGB BLD-MCNC: 12.2 G/DL (ref 11.7–15.5)
LDLC SERPL CALC-MCNC: 98 MG/DL (CALC)
LYMPHOCYTES # BLD AUTO: 2279 CELLS/UL (ref 850–3900)
LYMPHOCYTES NFR BLD AUTO: 43 %
MCH RBC QN AUTO: 30.6 PG (ref 27–33)
MCHC RBC AUTO-ENTMCNC: 31.7 G/DL (ref 32–36)
MCV RBC AUTO: 96.5 FL (ref 80–100)
MONOCYTES # BLD AUTO: 249 CELLS/UL (ref 200–950)
MONOCYTES NFR BLD AUTO: 4.7 %
NEUTROPHILS # BLD AUTO: 2602 CELLS/UL (ref 1500–7800)
NEUTROPHILS NFR BLD AUTO: 49.1 %
NONHDLC SERPL-MCNC: 113 MG/DL (CALC)
PLATELET # BLD AUTO: 298 THOUSAND/UL (ref 140–400)
PMV BLD REES-ECKER: 10 FL (ref 7.5–12.5)
POTASSIUM SERPL-SCNC: 4.4 MMOL/L (ref 3.5–5.3)
PROT SERPL-MCNC: 7.1 G/DL (ref 6.1–8.1)
RBC # BLD AUTO: 3.99 MILLION/UL (ref 3.8–5.1)
SODIUM SERPL-SCNC: 140 MMOL/L (ref 135–146)
TRIGL SERPL-MCNC: 67 MG/DL
TSH SERPL-ACNC: 1.26 MIU/L
WBC # BLD AUTO: 5.3 THOUSAND/UL (ref 3.8–10.8)

## 2019-09-05 ENCOUNTER — OFFICE VISIT (OUTPATIENT)
Dept: PRIMARY CARE CLINIC | Facility: CLINIC | Age: 44
End: 2019-09-05
Attending: NURSE PRACTITIONER
Payer: COMMERCIAL

## 2019-09-05 VITALS
WEIGHT: 108.81 LBS | BODY MASS INDEX: 21.36 KG/M2 | SYSTOLIC BLOOD PRESSURE: 108 MMHG | HEIGHT: 60 IN | RESPIRATION RATE: 16 BRPM | HEART RATE: 77 BPM | TEMPERATURE: 98 F | DIASTOLIC BLOOD PRESSURE: 62 MMHG | OXYGEN SATURATION: 99 %

## 2019-09-05 DIAGNOSIS — N20.0 RENAL STONE: ICD-10-CM

## 2019-09-05 DIAGNOSIS — Z01.419 WELL WOMAN EXAM WITH ROUTINE GYNECOLOGICAL EXAM: Primary | ICD-10-CM

## 2019-09-05 DIAGNOSIS — R92.8 ABNORMALITY OF LEFT BREAST ON SCREENING MAMMOGRAM: ICD-10-CM

## 2019-09-05 DIAGNOSIS — Z23 NEED FOR INFLUENZA VACCINATION: ICD-10-CM

## 2019-09-05 DIAGNOSIS — Z12.4 ENCOUNTER FOR PAPANICOLAOU SMEAR FOR CERVICAL CANCER SCREENING: ICD-10-CM

## 2019-09-05 PROCEDURE — 87624 HPV HI-RISK TYP POOLED RSLT: CPT

## 2019-09-05 PROCEDURE — 88141 LIQUID-BASED PAP SMEAR, SCREENING: ICD-10-PCS | Mod: ,,, | Performed by: PATHOLOGY

## 2019-09-05 PROCEDURE — 90686 FLU VACCINE (QUAD) GREATER THAN OR EQUAL TO 3YO PRESERVATIVE FREE IM: ICD-10-PCS | Mod: S$GLB,,, | Performed by: NURSE PRACTITIONER

## 2019-09-05 PROCEDURE — 99396 PR PREVENTIVE VISIT,EST,40-64: ICD-10-PCS | Mod: 25,S$GLB,, | Performed by: NURSE PRACTITIONER

## 2019-09-05 PROCEDURE — 90471 FLU VACCINE (QUAD) GREATER THAN OR EQUAL TO 3YO PRESERVATIVE FREE IM: ICD-10-PCS | Mod: S$GLB,,, | Performed by: NURSE PRACTITIONER

## 2019-09-05 PROCEDURE — 99999 PR PBB SHADOW E&M-EST. PATIENT-LVL IV: CPT | Mod: PBBFAC,,, | Performed by: NURSE PRACTITIONER

## 2019-09-05 PROCEDURE — 99999 PR PBB SHADOW E&M-EST. PATIENT-LVL IV: ICD-10-PCS | Mod: PBBFAC,,, | Performed by: NURSE PRACTITIONER

## 2019-09-05 PROCEDURE — 90686 IIV4 VACC NO PRSV 0.5 ML IM: CPT | Mod: S$GLB,,, | Performed by: NURSE PRACTITIONER

## 2019-09-05 PROCEDURE — 99396 PREV VISIT EST AGE 40-64: CPT | Mod: 25,S$GLB,, | Performed by: NURSE PRACTITIONER

## 2019-09-05 PROCEDURE — 88142 CYTOPATH C/V THIN LAYER: CPT | Performed by: PATHOLOGY

## 2019-09-05 PROCEDURE — 90471 IMMUNIZATION ADMIN: CPT | Mod: S$GLB,,, | Performed by: NURSE PRACTITIONER

## 2019-09-05 PROCEDURE — 88141 CYTOPATH C/V INTERPRET: CPT | Mod: ,,, | Performed by: PATHOLOGY

## 2019-09-05 NOTE — PROGRESS NOTES
Subjective:       Patient ID: Yulisa Chandler is a 44 y.o. female.    Chief Complaint: Gynecologic Exam    Patient is a 44 year old female who presents to clinic today for well woman exam with pap.  She did undergo mammogram and labs prior to this visit.  We will review labs.  Mammogram was abnormal with 1.1cm abnormality noted in left breast.  Pt. Is scheduled for diagnostic mammogram and US next week.  She has no acute complaints.  She is due for her influenza vaccination and will receive it today.      Review of Systems   All other systems reviewed and are negative.    Review of Systems:    Constitutional, HEENT, cardiovascular, pulmonary, gastrointestinal, genitourinary, musculoskeletal, integumentary, neurological, psychiatric, endocrine, and hematologic review of systems are normal except as documented in HPI.  Patient denies any other symptoms or complaints.   Objective:      Physical Exam   Nursing note and vitals reviewed.      Female Genitourinary Exam: Pelvic exam completed with speculum and clinic staff stand by.      Breasts: Appearance: Normal. No masses or tenderness. Self breast exam reviewed with patient.     External Genitalia: normal no lesions or vulvitis  Labia/Clitoris: Normal. No erythema or swelling  Bartholin's: Normal. No abscess appreciated  Urethra: Normal. No stenosis, discharge, or prolapse  Vagina: Normal. No odor. No atrophy, discharge, erythema, or tenderness. No obvious rectocele or cystocele.   Cervix: Normal. No CMT tenderness, no discharge, lesions or friability. Pap obtained and sent to lab for evaluation.   Uterus/Adnexa: Normal.  No tenderness or enlargement. No significant Cystocele, Enterocele or rectocele, and uterus well supported.  Right adnexum displays no mass and no tenderness.  Left adnexum displays no mass and no tenderness.  Bladder: Normal.  No distention.  Rectal: No tenderness, fissures, loss of sphinchter tone or external hemorrhoids.     US Pelvis Comp with  Transvag NON-OB (xpd)  Narrative: Comparison: None available    Technique: Transverse and longitudinal grayscale ultrasound images of the pelvis using transabdominal and transvaginal approach.  Color and spectral Doppler were utilized.    Findings:The uterus measures approximately 9.8 x 4.3 x 5.6 cm. Endometrial thickness of 1.2 cm is identified. No abnormal uterine mass is seen.    The right ovary measures approximately 3 x 1.6 x 2.2 cm. It contains a small follicle. It demonstrates normal Doppler flow. The left ovary was only seen transabdominally and measures approximately 2.7 x 2 x 1.1 cm. It demonstrates normal Doppler flow and small follicles.    No abnormal free pelvic fluid is identified.  Impression: 1.  Unremarkable pelvic ultrasound without findings to explain this patient's left lower quadrant pain.    Electronically signed by: Suzanne Thomas MD  Date:     11/29/17  Time:    16:31   US Abdomen Complete  Narrative: Comparison: None    Technique: Transverse and longitudinal gray scale ultrasound images of the abdomen along with limited color and spectral Doppler analysis.    Findings:The visualized pancreas is unremarkable. The distal tail is obscured by overlying bowel gas. Visualized upper abdominal aorta is normal in caliber. The hepatic echogenicity is normal. No focal hepatic mass or intrahepatic bile duct dilation is seen. Central portal venous flow is antegrade. It is mildly pulsatile which is within normal limits. The gallbladder is mildly well distended. No stones or sludge noted. Normal gallbladder wall thickness. Sonographic Stokes sign is negative. The common duct measures 5 mm in diameter near the vira hepatis. The distal duct is obscured.    The right kidney measures approximately 9.4 cm in length. Normal corticomedullary differentiation is seen. No hydronephrosis, stone or focal mass is seen.    The spleen is normal in size and echogenicity.    The left kidney measures approximately 8.8 cm  in length. Normal corticomedullary differentiation is seen. No hydronephrosis, stone or focal mass is seen.  Impression: 1.  Unremarkable abdominal ultrasound.    Electronically signed by: Suzanne Thomas MD  Date:     11/29/17  Time:    16:13       Medication List with Changes/Refills   Current Medications    BIOTIN 10,000 MCG CAP    Take by mouth once daily.    MULTIVITAMIN (ONE DAILY MULTIVITAMIN) PER TABLET    Take 1 tablet by mouth once daily.    POLYETHYLENE GLYCOL (GLYCOLAX) 17 GRAM PWPK    Take 17 g by mouth once daily.    TAMSULOSIN (FLOMAX) 0.4 MG CAP    Take 1 capsule (0.4 mg total) by mouth once daily. Take at bedtime    TIZANIDINE (ZANAFLEX) 4 MG TABLET    Take 1 tablet (4 mg total) by mouth every 6 (six) hours as needed.       Assessment:       1. Well woman exam with routine gynecological exam    2. Encounter for Papanicolaou smear for cervical cancer screening    3. Abnormality of left breast on screening mammogram    4. Renal stone    5. Need for influenza vaccination        Plan:       Well woman exam with routine gynecological exam  -     Liquid-based pap smear, screening  -     HPV High Risk Genotypes, PCR  -     Influenza - Quadrivalent (3 years & older) (PF)    Encounter for Papanicolaou smear for cervical cancer screening  -     Liquid-based pap smear, screening  -     HPV High Risk Genotypes, PCR    Abnormality of left breast on screening mammogram  US and mammogram scheduled.  Will refer to breast surgery as necessary.    Renal stone  Urology following.  Pt. With no complaints.  No pain.  Taking Flomax as prescribed and tolerating well.     Need for influenza vaccination  -     Influenza - Quadrivalent (3 years & older) (PF)          Follow up in about 2 weeks (around 9/19/2019) for f/u after US and Mammogram .    I have reviewed the patient's past medical/surgical and social histories and updated as appropriate. Medications were reviewed and discussed as appropriate including side effects  and risks versus benefit. Plan of care was reviewed and agreed upon with the patient.  An opportunity to ask questions was provided and explanation given. Patient verbalized understanding on all information reviewed and discussed.

## 2019-09-05 NOTE — PATIENT INSTRUCTIONS
The Range of Pap Test Results  When your Pap test is sent to the lab, the lab studies your cell samples and reports any abnormal cell changes. Your health care provider can discuss these changes with you. In some cases, an abnormal Pap test is due to an infection. More serious cell changes range from dysplasia to cancer. Talk to your health care provider about your Pap test.    Normal results  Cervical cells, even normal ones, are always changing. As they mature, normal squamous cells move from deeper layers within the cervix. Over time, these cells flatten and cover the surface of the cervix. Within the cervical canal, the cells are different. These glandular cells are taller and not as flat as the cells on the surface of the cervix. When a Pap test sample shows healthy cells of both types, the results are negative. Keep having Pap tests as often as directed.  Abnormal results  A positive Pap test result means some cells in the sample showed abnormal changes. These results are grouped by the type of cell change and the location, or extent, of the changes. Depending on the results, you may need further testing.  · Inflammation: Noncancerous changes are present. They may be due to normal cell repair. Or, they may be caused by an infection, such as HPV or yeast. Further testing may be needed. (Also called reactive cellular changes.)  · Atypical squamous cells: Test results are unclear. Cells on the surface of the cervix show changes, but their significance is not yet known. Testing for HPV and other sexually transmitted infections (STIs) may be needed. Treatment may be required. (Reported as ASC-US or ASC-H.)  · Atypical glandular cells: Cells lining the cervical canal show abnormal changes. Further testing is likely. You may also have treatment to destroy or remove problem cells. (Reported as AGC.)  · Mild dysplasia: Cells show distinct changes. More testing or HPV typing may be done. You may also have treatment to  destroy or remove problem cells. (Reported as low-grade BENIGNO or PAULETTE 1.)  · Moderate to severe dysplasia: Cells show precancerous changes. Or, noninvasive cancer (carcinoma in situ) may be present. Treatment to destroy or remove problem cells is likely. (Reported as high-grade BENIGNO or PAULETTE 2 or PAULETTE 3.)  · Cancer: Different types of cancer may be detected by your Pap test. More tests to assess the cancer's extent are likely. The type of treatment will depend on the test results and other factors, such as age and health history. (Reported as squamous cell carcinoma, endocervical adenocarcinoma in situ, or adenocarcinoma.)  Date Last Reviewed: 5/12/2015  © 5023-8712 [email or web address]. [address] [place] [address], [place] [address]. All rights reserved. This information is not intended as a substitute for professional medical care. Always follow your healthcare professional's instructions.        Understanding Human Papillomavirus (HPV)  Human papillomavirus (HPV) is a virus that causes warts. It can be hard to detect, so many people never even know they have it. Some strains (types) of HPV may cause warts on the hands, legs, or other parts of the body. These can spread from person to person. Other strains of HPV cause warts in the genital area. Of these, a few strains can lead to cancer in the area where the uterus and vagina meet (the cervix) and the genitals, as well as some other places. Treating genital forms of HPV now can help prevent serious health problems in the future.  How was I infected?  HPV is passed from person to person through contact with infected skin. Everyone with HPV has a different experience. Some people notice genital warts (condyloma) within a few months of exposure. In other people, warts take years to appear or may never appear. This makes it almost impossible to know when or by whom you were infected.    How warts form  HPV lives inside skin and mucous membrane (including in the mouth and  vagina). The virus can make skin cells reproduce more often than they should. These extra skin cells build up into warts.  1. HPV invades the skin.  2. DNA from the virus enters skin cells.  3. HPV causes infected skin cells to multiply and form warts.  4. The virus sheds, allowing it to be passed to others.  Date Last Reviewed: 1/1/2017  © 0435-9910 [employer]. [address] [place] [address], [place] [address]. All rights reserved. This information is not intended as a substitute for professional medical care. Always follow your healthcare professional's instructions.

## 2019-09-05 NOTE — PROGRESS NOTES
Patient identified by name and date of birth. Denies any allergies. Immunization administered as ordered by aseptic technique, tolerated well by pt.

## 2019-09-10 LAB
HPV HR 12 DNA CVX QL NAA+PROBE: NEGATIVE
HPV16 AG SPEC QL: NEGATIVE
HPV18 DNA SPEC QL NAA+PROBE: NEGATIVE

## 2019-09-20 ENCOUNTER — HOSPITAL ENCOUNTER (OUTPATIENT)
Dept: RADIOLOGY | Facility: HOSPITAL | Age: 44
Discharge: HOME | End: 2019-09-20
Attending: NURSE PRACTITIONER
Payer: COMMERCIAL

## 2019-09-20 VITALS — WEIGHT: 108 LBS | HEIGHT: 60 IN | BODY MASS INDEX: 21.2 KG/M2

## 2019-09-20 DIAGNOSIS — N63.20 BREAST MASS, LEFT: ICD-10-CM

## 2019-09-20 DIAGNOSIS — R92.8 ABNORMALITY OF LEFT BREAST ON SCREENING MAMMOGRAM: ICD-10-CM

## 2019-09-20 PROCEDURE — 76642 ULTRASOUND BREAST LIMITED: CPT | Mod: TC,PO,LT

## 2019-09-20 PROCEDURE — 77065 MAMMO DIGITAL DIAGNOSTIC LEFT WITH TOMOSYNTHESIS_CAD: ICD-10-PCS | Mod: 26,LT,, | Performed by: RADIOLOGY

## 2019-09-20 PROCEDURE — 77065 DX MAMMO INCL CAD UNI: CPT | Mod: 26,LT,, | Performed by: RADIOLOGY

## 2019-09-20 PROCEDURE — 77061 BREAST TOMOSYNTHESIS UNI: CPT | Mod: 26,LT,, | Performed by: RADIOLOGY

## 2019-09-20 PROCEDURE — 77065 DX MAMMO INCL CAD UNI: CPT | Mod: TC,PO,LT

## 2019-09-20 PROCEDURE — 77061 MAMMO DIGITAL DIAGNOSTIC LEFT WITH TOMOSYNTHESIS_CAD: ICD-10-PCS | Mod: 26,LT,, | Performed by: RADIOLOGY

## 2019-09-20 PROCEDURE — 76642 US BREAST LEFT LIMITED: ICD-10-PCS | Mod: 26,LT,, | Performed by: RADIOLOGY

## 2019-09-20 PROCEDURE — 76642 ULTRASOUND BREAST LIMITED: CPT | Mod: 26,LT,, | Performed by: RADIOLOGY

## 2019-09-20 PROCEDURE — 77061 BREAST TOMOSYNTHESIS UNI: CPT | Mod: TC,PO,LT

## 2020-12-08 ENCOUNTER — TELEPHONE (OUTPATIENT)
Dept: FAMILY MEDICINE | Facility: CLINIC | Age: 45
End: 2020-12-08

## 2020-12-08 NOTE — TELEPHONE ENCOUNTER
Spoke to pt to confirm her new pt appt on 12/15. Let pt know she will need to bring in all of her medication bottles with her and that at this time due to COVID-19 we are not allowing any visitors to come into the office with pts. Pt verbalized understanding. Pt stated she may have to call and reschedule her appt due to having a meeting that same date

## 2020-12-15 ENCOUNTER — OFFICE VISIT (OUTPATIENT)
Dept: FAMILY MEDICINE | Facility: CLINIC | Age: 45
End: 2020-12-15
Payer: COMMERCIAL

## 2020-12-15 VITALS
HEART RATE: 74 BPM | WEIGHT: 111 LBS | DIASTOLIC BLOOD PRESSURE: 70 MMHG | BODY MASS INDEX: 23.3 KG/M2 | SYSTOLIC BLOOD PRESSURE: 110 MMHG | HEIGHT: 58 IN

## 2020-12-15 DIAGNOSIS — Z12.31 BREAST CANCER SCREENING BY MAMMOGRAM: Primary | ICD-10-CM

## 2020-12-15 DIAGNOSIS — Z00.00 PHYSICAL EXAM: ICD-10-CM

## 2020-12-15 DIAGNOSIS — J30.1 ALLERGIC RHINITIS DUE TO POLLEN, UNSPECIFIED SEASONALITY: ICD-10-CM

## 2020-12-15 DIAGNOSIS — Z79.899 HIGH RISK MEDICATION USE: ICD-10-CM

## 2020-12-15 PROCEDURE — 99386 PR PREVENTIVE VISIT,NEW,40-64: ICD-10-PCS | Mod: S$GLB,,, | Performed by: NURSE PRACTITIONER

## 2020-12-15 PROCEDURE — 3008F PR BODY MASS INDEX (BMI) DOCUMENTED: ICD-10-PCS | Mod: S$GLB,,, | Performed by: NURSE PRACTITIONER

## 2020-12-15 PROCEDURE — 99386 PREV VISIT NEW AGE 40-64: CPT | Mod: S$GLB,,, | Performed by: NURSE PRACTITIONER

## 2020-12-15 PROCEDURE — 3008F BODY MASS INDEX DOCD: CPT | Mod: S$GLB,,, | Performed by: NURSE PRACTITIONER

## 2020-12-15 RX ORDER — FLUTICASONE PROPIONATE 50 MCG
2 SPRAY, SUSPENSION (ML) NASAL DAILY
Qty: 16 G | Refills: 0 | Status: SHIPPED | OUTPATIENT
Start: 2020-12-15 | End: 2021-09-30

## 2020-12-15 NOTE — PROGRESS NOTES
SUBJECTIVE:    Patient ID: Yulisa Chandler is a 45 y.o. female.    Chief Complaint: Establish Care (not on any meds//order for mammo tb )    45 year old female who present to establish care.patient seems to be pretty healthy. Medical history of kidney stones. She did undergo lithotripsy. She does have occasional back and neck pain. However sees chiropractor with relief. . Sleeps well. works at [employer]. Does exercise some. Tries tto eat healthy. woul dlike to have labs performed. linic on 8/26/19 with complaints of bilateral low back and flank pain, right upper.      No visits with results within 6 Month(s) from this visit.   Latest known visit with results is:   Office Visit on 09/05/2019   Component Date Value Ref Range Status    HPV High Risk type 16, PCR 09/05/2019 Negative  Negative Final    HPV High Risk type 18, PCR 09/05/2019 Negative  Negative Final    HPV other High Risk types, PCR 09/05/2019 Negative  Negative Final       Past Medical History:   Diagnosis Date    Kidney stone      Social History     Socioeconomic History    Marital status:      Spouse name: Not on file    Number of children: Not on file    Years of education: Not on file    Highest education level: Not on file   Occupational History    Occupation: human resources   Social Needs    Financial resource strain: Not on file    Food insecurity     Worry: Not on file     Inability: Not on file    Transportation needs     Medical: Not on file     Non-medical: Not on file   Tobacco Use    Smoking status: Never Smoker    Smokeless tobacco: Never Used   Substance and Sexual Activity    Alcohol use: Yes     Comment: once a week    Drug use: No    Sexual activity: Yes     Birth control/protection: Other-see comments     Comment: Tubal - 2011   Lifestyle    Physical activity     Days per week: Not on file     Minutes per session: Not on file    Stress: Rather much   Relationships    Social connections     Talks on  "phone: Not on file     Gets together: Not on file     Attends [employer]oration[employer] service: Not on file     Active member of club or organization: Not on file     Attends meetings of clubs or organizations: Not on file     Relationship status: Not on file   Other Topics Concern    Not on file   Social History Narrative    Sleeps pretty good.      Past Surgical History:   Procedure Laterality Date    AUGMENTATION OF BREAST      BREAST SURGERY       SECTION      RHINOPLASTY      TUBAL LIGATION       Family History   Problem Relation Age of Onset    Hypertension Mother     No Known Problems Father     Diabetes Paternal Grandmother        Review of patient's allergies indicates:  No Known Allergies    Current Outpatient Medications:     biotin 10,000 mcg Cap, Take by mouth once daily., Disp: , Rfl:     multivitamin (ONE DAILY MULTIVITAMIN) per tablet, Take 1 tablet by mouth once daily., Disp: , Rfl:     fluticasone propionate (FLONASE) 50 mcg/actuation nasal spray, 2 sprays (100 mcg total) by Each Nostril route once daily., Disp: 16 g, Rfl: 0    Review of Systems   Constitutional: Negative for chills, fever and unexpected weight change.   HENT: Negative for ear pain, rhinorrhea and sore throat.    Eyes: Negative for pain and visual disturbance.   Respiratory: Negative for cough and shortness of breath.    Cardiovascular: Negative for chest pain, palpitations and leg swelling.   Gastrointestinal: Negative for abdominal pain, diarrhea, nausea and vomiting.   Genitourinary: Negative for difficulty urinating, hematuria and vaginal bleeding.   Musculoskeletal: Negative for arthralgias.   Skin: Negative for rash.   Neurological: Negative for dizziness, weakness and headaches.   Psychiatric/Behavioral: Negative for agitation and sleep disturbance. The patient is not nervous/anxious.           Objective:      Vitals:    12/15/20 1208   BP: 110/70   Pulse: 74   Weight: 50.3 kg (111 lb)   Height: 4' 9.5" (1.461 m) "     Physical Exam  Constitutional:       Appearance: She is well-developed.   HENT:      Right Ear: External ear normal.      Left Ear: External ear normal.   Eyes:      Conjunctiva/sclera: Conjunctivae normal.      Pupils: Pupils are equal, round, and reactive to light.   Neck:      Musculoskeletal: Normal range of motion and neck supple.      Vascular: No JVD.   Cardiovascular:      Rate and Rhythm: Normal rate and regular rhythm.      Heart sounds: No murmur.   Pulmonary:      Effort: Pulmonary effort is normal.      Breath sounds: Normal breath sounds.   Abdominal:      General: Bowel sounds are normal.      Palpations: Abdomen is soft.   Musculoskeletal: Normal range of motion.         General: No deformity.   Lymphadenopathy:      Cervical: No cervical adenopathy.   Skin:     General: Skin is warm and dry.      Findings: No rash.   Neurological:      Mental Status: She is alert and oriented to person, place, and time.      Gait: Gait normal.   Psychiatric:         Speech: Speech normal.         Behavior: Behavior normal.           Assessment:       1. Breast cancer screening by mammogram    2. High risk medication use    3. Physical exam    4. Allergic rhinitis due to pollen, unspecified seasonality         Plan:       Breast cancer screening by mammogram  -     Cancel: Mammo Digital Screening Bilat; Future; Expected date: 12/15/2020  -     Mammo Digital Screening Bilat; Future; Expected date: 12/15/2020    High risk medication use  -     CBC Auto Differential; Future; Expected date: 12/15/2020  -     Comprehensive Metabolic Panel; Future; Expected date: 12/15/2020  -     TSH w/reflex to FT4; Future; Expected date: 12/15/2020  -     Urinalysis, Reflex to Urine Culture Urine, Clean Catch; Future; Expected date: 12/15/2020  -     Lipid Panel; Future; Expected date: 12/15/2020    Physical exam  -     CBC Auto Differential; Future; Expected date: 12/15/2020  -     Comprehensive Metabolic Panel; Future; Expected  date: 12/15/2020  -     TSH w/reflex to FT4; Future; Expected date: 12/15/2020  -     Urinalysis, Reflex to Urine Culture Urine, Clean Catch; Future; Expected date: 12/15/2020  -     Lipid Panel; Future; Expected date: 12/15/2020    Allergic rhinitis due to pollen, unspecified seasonality    Other orders  -     fluticasone propionate (FLONASE) 50 mcg/actuation nasal spray; 2 sprays (100 mcg total) by Each Nostril route once daily.  Dispense: 16 g; Refill: 0      Follow up in about 6 months (around 6/15/2021) for medication management.        12/21/2020 Radha Souza

## 2020-12-30 LAB
ALBUMIN SERPL-MCNC: 4.7 G/DL (ref 3.6–5.1)
ALBUMIN/GLOB SERPL: 1.6 (CALC) (ref 1–2.5)
ALP SERPL-CCNC: 40 U/L (ref 31–125)
ALT SERPL-CCNC: 18 U/L (ref 6–29)
APPEARANCE UR: CLEAR
AST SERPL-CCNC: 20 U/L (ref 10–35)
BACTERIA #/AREA URNS HPF: NORMAL /HPF
BACTERIA UR CULT: NORMAL
BASOPHILS # BLD AUTO: 60 CELLS/UL (ref 0–200)
BASOPHILS NFR BLD AUTO: 1 %
BILIRUB SERPL-MCNC: 0.5 MG/DL (ref 0.2–1.2)
BILIRUB UR QL STRIP: NEGATIVE
BUN SERPL-MCNC: 15 MG/DL (ref 7–25)
BUN/CREAT SERPL: NORMAL (CALC) (ref 6–22)
CALCIUM SERPL-MCNC: 9.9 MG/DL (ref 8.6–10.2)
CHLORIDE SERPL-SCNC: 102 MMOL/L (ref 98–110)
CHOLEST SERPL-MCNC: 215 MG/DL
CHOLEST/HDLC SERPL: 2.8 (CALC)
CO2 SERPL-SCNC: 28 MMOL/L (ref 20–32)
COLOR UR: YELLOW
CREAT SERPL-MCNC: 0.74 MG/DL (ref 0.5–1.1)
EOSINOPHIL # BLD AUTO: 312 CELLS/UL (ref 15–500)
EOSINOPHIL NFR BLD AUTO: 5.2 %
ERYTHROCYTE [DISTWIDTH] IN BLOOD BY AUTOMATED COUNT: 12.8 % (ref 11–15)
GFRSERPLBLD MDRD-ARVRAT: 98 ML/MIN/1.73M2
GLOBULIN SER CALC-MCNC: 2.9 G/DL (CALC) (ref 1.9–3.7)
GLUCOSE SERPL-MCNC: 97 MG/DL (ref 65–99)
GLUCOSE UR QL STRIP: NEGATIVE
HCT VFR BLD AUTO: 41.4 % (ref 35–45)
HDLC SERPL-MCNC: 77 MG/DL
HGB BLD-MCNC: 13 G/DL (ref 11.7–15.5)
HGB UR QL STRIP: NEGATIVE
HYALINE CASTS #/AREA URNS LPF: NORMAL /LPF
KETONES UR QL STRIP: NEGATIVE
LDLC SERPL CALC-MCNC: 110 MG/DL (CALC)
LEUKOCYTE ESTERASE UR QL STRIP: NEGATIVE
LYMPHOCYTES # BLD AUTO: 2496 CELLS/UL (ref 850–3900)
LYMPHOCYTES NFR BLD AUTO: 41.6 %
MCH RBC QN AUTO: 29.5 PG (ref 27–33)
MCHC RBC AUTO-ENTMCNC: 31.4 G/DL (ref 32–36)
MCV RBC AUTO: 94.1 FL (ref 80–100)
MONOCYTES # BLD AUTO: 330 CELLS/UL (ref 200–950)
MONOCYTES NFR BLD AUTO: 5.5 %
NEUTROPHILS # BLD AUTO: 2802 CELLS/UL (ref 1500–7800)
NEUTROPHILS NFR BLD AUTO: 46.7 %
NITRITE UR QL STRIP: NEGATIVE
NONHDLC SERPL-MCNC: 138 MG/DL (CALC)
PH UR STRIP: 7 [PH] (ref 5–8)
PLATELET # BLD AUTO: 283 THOUSAND/UL (ref 140–400)
PMV BLD REES-ECKER: 10 FL (ref 7.5–12.5)
POTASSIUM SERPL-SCNC: 4.4 MMOL/L (ref 3.5–5.3)
PROT SERPL-MCNC: 7.6 G/DL (ref 6.1–8.1)
PROT UR QL STRIP: NEGATIVE
RBC # BLD AUTO: 4.4 MILLION/UL (ref 3.8–5.1)
RBC #/AREA URNS HPF: NORMAL /HPF
SODIUM SERPL-SCNC: 137 MMOL/L (ref 135–146)
SP GR UR STRIP: 1 (ref 1–1.03)
SQUAMOUS #/AREA URNS HPF: NORMAL /HPF
TRIGL SERPL-MCNC: 162 MG/DL
TSH SERPL-ACNC: 1.46 MIU/L
WBC # BLD AUTO: 6 THOUSAND/UL (ref 3.8–10.8)
WBC #/AREA URNS HPF: NORMAL /HPF

## 2021-01-07 ENCOUNTER — TELEPHONE (OUTPATIENT)
Dept: FAMILY MEDICINE | Facility: CLINIC | Age: 46
End: 2021-01-07

## 2021-01-21 ENCOUNTER — TELEPHONE (OUTPATIENT)
Dept: FAMILY MEDICINE | Facility: CLINIC | Age: 46
End: 2021-01-21

## 2021-01-26 ENCOUNTER — TELEPHONE (OUTPATIENT)
Dept: FAMILY MEDICINE | Facility: CLINIC | Age: 46
End: 2021-01-26

## 2021-08-04 ENCOUNTER — TELEPHONE (OUTPATIENT)
Dept: FAMILY MEDICINE | Facility: CLINIC | Age: 46
End: 2021-08-04

## 2021-08-04 DIAGNOSIS — Z12.31 OTHER SCREENING MAMMOGRAM: Primary | ICD-10-CM

## 2021-08-24 ENCOUNTER — TELEPHONE (OUTPATIENT)
Dept: FAMILY MEDICINE | Facility: CLINIC | Age: 46
End: 2021-08-24

## 2021-08-24 DIAGNOSIS — Z12.31 OTHER SCREENING MAMMOGRAM: Primary | ICD-10-CM

## 2021-09-09 ENCOUNTER — PATIENT MESSAGE (OUTPATIENT)
Dept: RADIOLOGY | Facility: HOSPITAL | Age: 46
End: 2021-09-09

## 2021-09-24 ENCOUNTER — TELEPHONE (OUTPATIENT)
Dept: RADIOLOGY | Facility: HOSPITAL | Age: 46
End: 2021-09-24
Payer: COMMERCIAL

## 2021-09-27 ENCOUNTER — TELEPHONE (OUTPATIENT)
Dept: FAMILY MEDICINE | Facility: CLINIC | Age: 46
End: 2021-09-27

## 2021-09-27 DIAGNOSIS — Z00.00 PHYSICAL EXAM: ICD-10-CM

## 2021-09-27 DIAGNOSIS — Z79.899 HIGH RISK MEDICATION USE: Primary | ICD-10-CM

## 2021-09-30 ENCOUNTER — OFFICE VISIT (OUTPATIENT)
Dept: FAMILY MEDICINE | Facility: CLINIC | Age: 46
End: 2021-09-30
Payer: COMMERCIAL

## 2021-09-30 VITALS
BODY MASS INDEX: 23.3 KG/M2 | HEIGHT: 58 IN | SYSTOLIC BLOOD PRESSURE: 122 MMHG | HEART RATE: 72 BPM | DIASTOLIC BLOOD PRESSURE: 80 MMHG | WEIGHT: 111 LBS

## 2021-09-30 DIAGNOSIS — Z87.442 HISTORY OF KIDNEY STONES: ICD-10-CM

## 2021-09-30 DIAGNOSIS — Z00.00 PHYSICAL EXAM: Primary | ICD-10-CM

## 2021-09-30 PROCEDURE — 3008F PR BODY MASS INDEX (BMI) DOCUMENTED: ICD-10-PCS | Mod: S$GLB,,, | Performed by: NURSE PRACTITIONER

## 2021-09-30 PROCEDURE — 1160F PR REVIEW ALL MEDS BY PRESCRIBER/CLIN PHARMACIST DOCUMENTED: ICD-10-PCS | Mod: S$GLB,,, | Performed by: NURSE PRACTITIONER

## 2021-09-30 PROCEDURE — 99396 PR PREVENTIVE VISIT,EST,40-64: ICD-10-PCS | Mod: 25,S$GLB,, | Performed by: NURSE PRACTITIONER

## 2021-09-30 PROCEDURE — 3008F BODY MASS INDEX DOCD: CPT | Mod: S$GLB,,, | Performed by: NURSE PRACTITIONER

## 2021-09-30 PROCEDURE — 1160F RVW MEDS BY RX/DR IN RCRD: CPT | Mod: S$GLB,,, | Performed by: NURSE PRACTITIONER

## 2021-09-30 PROCEDURE — 93000 POCT EKG 12-LEAD: ICD-10-PCS | Mod: S$GLB,,, | Performed by: NURSE PRACTITIONER

## 2021-09-30 PROCEDURE — 93000 ELECTROCARDIOGRAM COMPLETE: CPT | Mod: S$GLB,,, | Performed by: NURSE PRACTITIONER

## 2021-09-30 PROCEDURE — 99396 PREV VISIT EST AGE 40-64: CPT | Mod: 25,S$GLB,, | Performed by: NURSE PRACTITIONER

## 2021-10-01 LAB
ALBUMIN SERPL-MCNC: 4.6 G/DL (ref 3.6–5.1)
ALBUMIN/GLOB SERPL: 1.6 (CALC) (ref 1–2.5)
ALP SERPL-CCNC: 38 U/L (ref 31–125)
ALT SERPL-CCNC: 12 U/L (ref 6–29)
AST SERPL-CCNC: 16 U/L (ref 10–35)
BASOPHILS # BLD AUTO: 52 CELLS/UL (ref 0–200)
BASOPHILS NFR BLD AUTO: 1 %
BILIRUB SERPL-MCNC: 0.3 MG/DL (ref 0.2–1.2)
BUN SERPL-MCNC: 12 MG/DL (ref 7–25)
BUN/CREAT SERPL: NORMAL (CALC) (ref 6–22)
CALCIUM SERPL-MCNC: 9.5 MG/DL (ref 8.6–10.2)
CHLORIDE SERPL-SCNC: 105 MMOL/L (ref 98–110)
CHOLEST SERPL-MCNC: 193 MG/DL
CHOLEST/HDLC SERPL: 2.8 (CALC)
CO2 SERPL-SCNC: 28 MMOL/L (ref 20–32)
CREAT SERPL-MCNC: 0.67 MG/DL (ref 0.5–1.1)
EOSINOPHIL # BLD AUTO: 208 CELLS/UL (ref 15–500)
EOSINOPHIL NFR BLD AUTO: 4 %
ERYTHROCYTE [DISTWIDTH] IN BLOOD BY AUTOMATED COUNT: 13.1 % (ref 11–15)
GLOBULIN SER CALC-MCNC: 2.8 G/DL (CALC) (ref 1.9–3.7)
GLUCOSE SERPL-MCNC: 95 MG/DL (ref 65–99)
HCT VFR BLD AUTO: 37.9 % (ref 35–45)
HDLC SERPL-MCNC: 68 MG/DL
HGB BLD-MCNC: 12.4 G/DL (ref 11.7–15.5)
LDLC SERPL CALC-MCNC: 102 MG/DL (CALC)
LYMPHOCYTES # BLD AUTO: 2387 CELLS/UL (ref 850–3900)
LYMPHOCYTES NFR BLD AUTO: 45.9 %
MCH RBC QN AUTO: 31.2 PG (ref 27–33)
MCHC RBC AUTO-ENTMCNC: 32.7 G/DL (ref 32–36)
MCV RBC AUTO: 95.2 FL (ref 80–100)
MONOCYTES # BLD AUTO: 291 CELLS/UL (ref 200–950)
MONOCYTES NFR BLD AUTO: 5.6 %
NEUTROPHILS # BLD AUTO: 2262 CELLS/UL (ref 1500–7800)
NEUTROPHILS NFR BLD AUTO: 43.5 %
NONHDLC SERPL-MCNC: 125 MG/DL (CALC)
PLATELET # BLD AUTO: 310 THOUSAND/UL (ref 140–400)
PMV BLD REES-ECKER: 9.9 FL (ref 7.5–12.5)
POTASSIUM SERPL-SCNC: 4.4 MMOL/L (ref 3.5–5.3)
PROT SERPL-MCNC: 7.4 G/DL (ref 6.1–8.1)
RBC # BLD AUTO: 3.98 MILLION/UL (ref 3.8–5.1)
SODIUM SERPL-SCNC: 139 MMOL/L (ref 135–146)
TRIGL SERPL-MCNC: 136 MG/DL
TSH SERPL-ACNC: 1.19 MIU/L
WBC # BLD AUTO: 5.2 THOUSAND/UL (ref 3.8–10.8)

## 2021-10-07 LAB
EKG 12-LEAD: NORMAL
PR INTERVAL: NORMAL
PRT AXES: NORMAL
QRS DURATION: NORMAL
QT/QTC: NORMAL
VENTRICULAR RATE: NORMAL

## 2021-11-11 ENCOUNTER — TELEPHONE (OUTPATIENT)
Dept: FAMILY MEDICINE | Facility: CLINIC | Age: 46
End: 2021-11-11
Payer: COMMERCIAL

## 2021-11-12 ENCOUNTER — PATIENT MESSAGE (OUTPATIENT)
Dept: FAMILY MEDICINE | Facility: CLINIC | Age: 46
End: 2021-11-12
Payer: COMMERCIAL

## 2021-11-12 LAB
APPEARANCE UR: CLEAR
BACTERIA #/AREA URNS HPF: ABNORMAL /HPF
BACTERIA UR CULT: ABNORMAL
BILIRUB UR QL STRIP: NEGATIVE
COLOR UR: YELLOW
GLUCOSE UR QL STRIP: NEGATIVE
HGB UR QL STRIP: NEGATIVE
HYALINE CASTS #/AREA URNS LPF: ABNORMAL /LPF
KETONES UR QL STRIP: NEGATIVE
LEUKOCYTE ESTERASE UR QL STRIP: NEGATIVE
NITRITE UR QL STRIP: NEGATIVE
PH UR STRIP: 6.5 [PH] (ref 5–8)
PROT UR QL STRIP: NEGATIVE
RBC #/AREA URNS HPF: ABNORMAL /HPF
SP GR UR STRIP: 1.01 (ref 1–1.03)
SQUAMOUS #/AREA URNS HPF: ABNORMAL /HPF
WBC #/AREA URNS HPF: ABNORMAL /HPF

## 2021-11-15 ENCOUNTER — PATIENT MESSAGE (OUTPATIENT)
Dept: FAMILY MEDICINE | Facility: CLINIC | Age: 46
End: 2021-11-15
Payer: COMMERCIAL

## 2021-11-15 DIAGNOSIS — Z87.442 HISTORY OF KIDNEY STONES: Primary | ICD-10-CM

## 2021-12-17 ENCOUNTER — HOSPITAL ENCOUNTER (OUTPATIENT)
Dept: RADIOLOGY | Facility: HOSPITAL | Age: 46
Discharge: HOME | End: 2021-12-17
Attending: NURSE PRACTITIONER
Payer: COMMERCIAL

## 2021-12-17 DIAGNOSIS — Z12.31 OTHER SCREENING MAMMOGRAM: ICD-10-CM

## 2021-12-17 PROCEDURE — 77063 MAMMO DIGITAL SCREENING BILAT WITH TOMO: ICD-10-PCS | Mod: 26,,, | Performed by: RADIOLOGY

## 2021-12-17 PROCEDURE — 77067 SCR MAMMO BI INCL CAD: CPT | Mod: 26,,, | Performed by: RADIOLOGY

## 2021-12-17 PROCEDURE — 77067 SCR MAMMO BI INCL CAD: CPT | Mod: TC

## 2021-12-17 PROCEDURE — 77067 MAMMO DIGITAL SCREENING BILAT WITH TOMO: ICD-10-PCS | Mod: 26,,, | Performed by: RADIOLOGY

## 2021-12-17 PROCEDURE — 77063 BREAST TOMOSYNTHESIS BI: CPT | Mod: 26,,, | Performed by: RADIOLOGY

## 2021-12-20 ENCOUNTER — TELEPHONE (OUTPATIENT)
Dept: FAMILY MEDICINE | Facility: CLINIC | Age: 46
End: 2021-12-20
Payer: COMMERCIAL

## 2021-12-20 ENCOUNTER — OFFICE VISIT (OUTPATIENT)
Dept: UROLOGY | Facility: CLINIC | Age: 46
End: 2021-12-20
Payer: COMMERCIAL

## 2021-12-20 VITALS
HEIGHT: 57 IN | HEART RATE: 73 BPM | WEIGHT: 114 LBS | SYSTOLIC BLOOD PRESSURE: 130 MMHG | DIASTOLIC BLOOD PRESSURE: 87 MMHG | BODY MASS INDEX: 24.59 KG/M2

## 2021-12-20 DIAGNOSIS — M54.50 MIDLINE LOW BACK PAIN WITHOUT SCIATICA, UNSPECIFIED CHRONICITY: ICD-10-CM

## 2021-12-20 DIAGNOSIS — N20.0 RENAL STONE: Primary | ICD-10-CM

## 2021-12-20 DIAGNOSIS — R39.9 UTI SYMPTOMS: ICD-10-CM

## 2021-12-20 LAB
BILIRUB SERPL-MCNC: ABNORMAL MG/DL
BLOOD URINE, POC: ABNORMAL
CLARITY, POC UA: CLEAR
COLOR, POC UA: YELLOW
GLUCOSE UR QL STRIP: ABNORMAL
KETONES UR QL STRIP: ABNORMAL
LEUKOCYTE ESTERASE URINE, POC: ABNORMAL
MICROSCOPIC COMMENT: NORMAL
NITRITE, POC UA: ABNORMAL
PH, POC UA: 6.5
PROTEIN, POC: ABNORMAL
RBC #/AREA URNS HPF: 0 /HPF (ref 0–4)
SPECIFIC GRAVITY, POC UA: 1.02
UROBILINOGEN, POC UA: ABNORMAL
WBC #/AREA URNS HPF: 0 /HPF (ref 0–5)

## 2021-12-20 PROCEDURE — 81002 POCT URINE DIPSTICK WITHOUT MICROSCOPE: ICD-10-PCS | Mod: S$GLB,,, | Performed by: NURSE PRACTITIONER

## 2021-12-20 PROCEDURE — 81000 URINALYSIS NONAUTO W/SCOPE: CPT | Performed by: NURSE PRACTITIONER

## 2021-12-20 PROCEDURE — 99999 PR PBB SHADOW E&M-EST. PATIENT-LVL III: CPT | Mod: PBBFAC,,, | Performed by: NURSE PRACTITIONER

## 2021-12-20 PROCEDURE — 99213 PR OFFICE/OUTPT VISIT, EST, LEVL III, 20-29 MIN: ICD-10-PCS | Mod: 25,S$GLB,, | Performed by: NURSE PRACTITIONER

## 2021-12-20 PROCEDURE — 99999 PR PBB SHADOW E&M-EST. PATIENT-LVL III: ICD-10-PCS | Mod: PBBFAC,,, | Performed by: NURSE PRACTITIONER

## 2021-12-20 PROCEDURE — 99213 OFFICE O/P EST LOW 20 MIN: CPT | Mod: 25,S$GLB,, | Performed by: NURSE PRACTITIONER

## 2021-12-20 PROCEDURE — 81002 URINALYSIS NONAUTO W/O SCOPE: CPT | Mod: S$GLB,,, | Performed by: NURSE PRACTITIONER

## 2021-12-21 ENCOUNTER — HOSPITAL ENCOUNTER (OUTPATIENT)
Dept: RADIOLOGY | Facility: HOSPITAL | Age: 46
Discharge: HOME | End: 2021-12-21
Attending: NURSE PRACTITIONER
Payer: COMMERCIAL

## 2021-12-21 ENCOUNTER — TELEPHONE (OUTPATIENT)
Dept: UROLOGY | Facility: CLINIC | Age: 46
End: 2021-12-21
Payer: COMMERCIAL

## 2021-12-21 DIAGNOSIS — N20.0 RENAL STONE: ICD-10-CM

## 2021-12-21 DIAGNOSIS — R39.9 UTI SYMPTOMS: ICD-10-CM

## 2021-12-21 DIAGNOSIS — R10.9 ABDOMINAL PAIN, UNSPECIFIED ABDOMINAL LOCATION: ICD-10-CM

## 2021-12-21 PROCEDURE — 74018 RADEX ABDOMEN 1 VIEW: CPT | Mod: 26,,, | Performed by: RADIOLOGY

## 2021-12-21 PROCEDURE — 76770 US EXAM ABDO BACK WALL COMP: CPT | Mod: 26,,, | Performed by: RADIOLOGY

## 2021-12-21 PROCEDURE — 76770 US RETROPERITONEAL COMPLETE: ICD-10-PCS | Mod: 26,,, | Performed by: RADIOLOGY

## 2021-12-21 PROCEDURE — 76770 US EXAM ABDO BACK WALL COMP: CPT | Mod: TC

## 2021-12-21 PROCEDURE — 74018 XR ABDOMEN AP 1 VIEW: ICD-10-PCS | Mod: 26,,, | Performed by: RADIOLOGY

## 2021-12-21 PROCEDURE — 74018 RADEX ABDOMEN 1 VIEW: CPT | Mod: TC,FY

## 2021-12-22 ENCOUNTER — HOSPITAL ENCOUNTER (OUTPATIENT)
Dept: RADIOLOGY | Facility: HOSPITAL | Age: 46
Discharge: HOME | End: 2021-12-22
Attending: NURSE PRACTITIONER
Payer: COMMERCIAL

## 2021-12-22 DIAGNOSIS — R10.9 ABDOMINAL PAIN, UNSPECIFIED ABDOMINAL LOCATION: ICD-10-CM

## 2021-12-22 PROCEDURE — 74176 CT ABD & PELVIS W/O CONTRAST: CPT | Mod: 26,,, | Performed by: RADIOLOGY

## 2021-12-22 PROCEDURE — 74176 CT ABD & PELVIS W/O CONTRAST: CPT | Mod: TC

## 2021-12-22 PROCEDURE — 74176 CT RENAL STONE STUDY ABD PELVIS WO: ICD-10-PCS | Mod: 26,,, | Performed by: RADIOLOGY

## 2022-02-22 ENCOUNTER — HOSPITAL ENCOUNTER (OUTPATIENT)
Dept: RADIOLOGY | Facility: HOSPITAL | Age: 47
Discharge: HOME | End: 2022-02-22
Attending: NURSE PRACTITIONER
Payer: COMMERCIAL

## 2022-02-22 ENCOUNTER — OFFICE VISIT (OUTPATIENT)
Dept: FAMILY MEDICINE | Facility: CLINIC | Age: 47
End: 2022-02-22
Payer: COMMERCIAL

## 2022-02-22 ENCOUNTER — PATIENT MESSAGE (OUTPATIENT)
Dept: UROLOGY | Facility: CLINIC | Age: 47
End: 2022-02-22
Payer: COMMERCIAL

## 2022-02-22 VITALS
DIASTOLIC BLOOD PRESSURE: 80 MMHG | SYSTOLIC BLOOD PRESSURE: 122 MMHG | HEIGHT: 57 IN | WEIGHT: 115 LBS | BODY MASS INDEX: 24.81 KG/M2

## 2022-02-22 DIAGNOSIS — R05.9 COUGH: ICD-10-CM

## 2022-02-22 DIAGNOSIS — M54.12 CERVICAL RADICULOPATHY: ICD-10-CM

## 2022-02-22 DIAGNOSIS — Z79.899 HIGH RISK MEDICATION USE: ICD-10-CM

## 2022-02-22 DIAGNOSIS — Z12.11 SCREENING FOR COLON CANCER: Primary | ICD-10-CM

## 2022-02-22 DIAGNOSIS — Z00.00 PHYSICAL EXAM: ICD-10-CM

## 2022-02-22 PROCEDURE — 3008F BODY MASS INDEX DOCD: CPT | Mod: S$GLB,,, | Performed by: NURSE PRACTITIONER

## 2022-02-22 PROCEDURE — 3008F PR BODY MASS INDEX (BMI) DOCUMENTED: ICD-10-PCS | Mod: S$GLB,,, | Performed by: NURSE PRACTITIONER

## 2022-02-22 PROCEDURE — 72050 X-RAY EXAM NECK SPINE 4/5VWS: CPT | Mod: TC,PO

## 2022-02-22 PROCEDURE — 1160F RVW MEDS BY RX/DR IN RCRD: CPT | Mod: S$GLB,,, | Performed by: NURSE PRACTITIONER

## 2022-02-22 PROCEDURE — 99214 PR OFFICE/OUTPT VISIT, EST, LEVL IV, 30-39 MIN: ICD-10-PCS | Mod: S$GLB,,, | Performed by: NURSE PRACTITIONER

## 2022-02-22 PROCEDURE — 99214 OFFICE O/P EST MOD 30 MIN: CPT | Mod: S$GLB,,, | Performed by: NURSE PRACTITIONER

## 2022-02-22 PROCEDURE — 1160F PR REVIEW ALL MEDS BY PRESCRIBER/CLIN PHARMACIST DOCUMENTED: ICD-10-PCS | Mod: S$GLB,,, | Performed by: NURSE PRACTITIONER

## 2022-02-22 PROCEDURE — 71046 X-RAY EXAM CHEST 2 VIEWS: CPT | Mod: TC,PO

## 2022-02-22 RX ORDER — METHYLPREDNISOLONE 4 MG/1
TABLET ORAL
Qty: 21 EACH | Refills: 0 | Status: SHIPPED | OUTPATIENT
Start: 2022-02-22 | End: 2022-03-15

## 2022-02-22 RX ORDER — TIZANIDINE 4 MG/1
4 TABLET ORAL EVERY 6 HOURS PRN
Qty: 30 TABLET | Refills: 0 | Status: SHIPPED | OUTPATIENT
Start: 2022-02-22 | End: 2022-03-04

## 2022-03-03 NOTE — PROGRESS NOTES
SUBJECTIVE:    Patient ID: Yulisa Chandler is a 46 y.o. female.    Chief Complaint: left arm pain (Pain on the left side x 1 year//no bottles// DBL )    46 year old female presents for urgent visit. Reports that has been having left shoulder pain. Originally thought it was related to covid vaccine however is not getting better. Did have some numbness in fingers today. Denies any trauma or injury. Pain does seem worse at the end of the day. Has not taken anything. Does not do heavy lifting. .       Office Visit on 12/20/2021   Component Date Value Ref Range Status    Color, UA 12/20/2021 Yellow   Final    pH, UA 12/20/2021 6.5   Final    WBC, UA 12/20/2021 Neg   Final    Nitrite, UA 12/20/2021 Neg   Final    Protein, POC 12/20/2021 Neg   Final    Glucose, UA 12/20/2021 Neg   Final    Ketones, UA 12/20/2021 Neg   Final    Urobilinogen, UA 12/20/2021 0.2 wnl   Final    Bilirubin, POC 12/20/2021 Neg   Final    Blood, UA 12/20/2021 Trace-intact   Final    Clarity, UA 12/20/2021 Clear   Final    Spec Grav UA 12/20/2021 1.025   Final    RBC, UA 12/20/2021 0  0 - 4 /hpf Final    WBC, UA 12/20/2021 0  0 - 5 /hpf Final    Microscopic Comment 12/20/2021 SEE COMMENT   Final   Office Visit on 09/30/2021   Component Date Value Ref Range Status    EKG 12-Lead 10/07/2021    Final   Telephone on 09/27/2021   Component Date Value Ref Range Status    WBC 09/30/2021 5.2  3.8 - 10.8 Thousand/uL Final    RBC 09/30/2021 3.98  3.80 - 5.10 Million/uL Final    Hemoglobin 09/30/2021 12.4  11.7 - 15.5 g/dL Final    Hematocrit 09/30/2021 37.9  35.0 - 45.0 % Final    MCV 09/30/2021 95.2  80.0 - 100.0 fL Final    MCH 09/30/2021 31.2  27.0 - 33.0 pg Final    MCHC 09/30/2021 32.7  32.0 - 36.0 g/dL Final    RDW 09/30/2021 13.1  11.0 - 15.0 % Final    Platelets 09/30/2021 310  140 - 400 Thousand/uL Final    MPV 09/30/2021 9.9  7.5 - 12.5 fL Final    Neutrophils, Abs 09/30/2021 2,262  1,500 - 7,800 cells/uL Final    Lymph #  09/30/2021 2,387  850 - 3,900 cells/uL Final    Mono # 09/30/2021 291  200 - 950 cells/uL Final    Eos # 09/30/2021 208  15 - 500 cells/uL Final    Baso # 09/30/2021 52  0 - 200 cells/uL Final    Neutrophils Relative 09/30/2021 43.5  % Final    Lymph % 09/30/2021 45.9  % Final    Mono % 09/30/2021 5.6  % Final    Eosinophil % 09/30/2021 4.0  % Final    Basophil % 09/30/2021 1.0  % Final    Glucose 09/30/2021 95  65 - 99 mg/dL Final    BUN 09/30/2021 12  7 - 25 mg/dL Final    Creatinine 09/30/2021 0.67  0.50 - 1.10 mg/dL Final    eGFR if non African American 09/30/2021 105  > OR = 60 mL/min/1.73m2 Final    eGFR if  09/30/2021 122  > OR = 60 mL/min/1.73m2 Final    BUN/Creatinine Ratio 09/30/2021 NOT APPLICABLE  6 - 22 (calc) Final    Sodium 09/30/2021 139  135 - 146 mmol/L Final    Potassium 09/30/2021 4.4  3.5 - 5.3 mmol/L Final    Chloride 09/30/2021 105  98 - 110 mmol/L Final    CO2 09/30/2021 28  20 - 32 mmol/L Final    Calcium 09/30/2021 9.5  8.6 - 10.2 mg/dL Final    Total Protein 09/30/2021 7.4  6.1 - 8.1 g/dL Final    Albumin 09/30/2021 4.6  3.6 - 5.1 g/dL Final    Globulin, Total 09/30/2021 2.8  1.9 - 3.7 g/dL (calc) Final    Albumin/Globulin Ratio 09/30/2021 1.6  1.0 - 2.5 (calc) Final    Total Bilirubin 09/30/2021 0.3  0.2 - 1.2 mg/dL Final    Alkaline Phosphatase 09/30/2021 38  31 - 125 U/L Final    AST 09/30/2021 16  10 - 35 U/L Final    ALT 09/30/2021 12  6 - 29 U/L Final    Cholesterol 09/30/2021 193  <200 mg/dL Final    HDL 09/30/2021 68  > OR = 50 mg/dL Final    Triglycerides 09/30/2021 136  <150 mg/dL Final    LDL Cholesterol 09/30/2021 102 (A) mg/dL (calc) Final    HDL/Cholesterol Ratio 09/30/2021 2.8  <5.0 (calc) Final    Non HDL Chol. (LDL+VLDL) 09/30/2021 125  <130 mg/dL (calc) Final    Color, UA 11/11/2021 YELLOW  YELLOW Final    Appearance, UA 11/11/2021 CLEAR  CLEAR Final    Specific [place], UA 11/11/2021 1.015  1.001 - 1.035 Final    pH,  UA 2021 6.5  5.0 - 8.0 Final    Glucose, UA 2021 NEGATIVE  NEGATIVE Final    Bilirubin, UA 2021 NEGATIVE  NEGATIVE Final    Ketones, UA 2021 NEGATIVE  NEGATIVE Final    Occult Blood UA 2021 NEGATIVE  NEGATIVE Final    Protein, UA 2021 NEGATIVE  NEGATIVE Final    Nitrite, UA 2021 NEGATIVE  NEGATIVE Final    Leukocytes, UA 2021 NEGATIVE  NEGATIVE Final    WBC Casts, UA 2021 NONE SEEN  < OR = 5 /HPF Final    RBC Casts, UA 2021 0-2  < OR = 2 /HPF Final    Squam Epithel, UA 2021 0-5  < OR = 5 /HPF Final    Bacteria, UA 2021 FEW (A) NONE SEEN /HPF Final    Hyaline Casts, UA 2021 NONE SEEN  NONE SEEN /LPF Final    Reflexive Urine Culture 2021    Final    TSH w/reflex to FT4 2021 1.19  mIU/L Final       Past Medical History:   Diagnosis Date    Kidney stone      Social History     Socioeconomic History    Marital status:    Occupational History    Occupation: human resources   Tobacco Use    Smoking status: Never Smoker    Smokeless tobacco: Never Used   Substance and Sexual Activity    Alcohol use: Yes     Comment: once a week    Drug use: No    Sexual activity: Yes     Birth control/protection: Other-see comments     Comment: Tubal -    Social History Narrative    Sleeps pretty good.      Past Surgical History:   Procedure Laterality Date    AUGMENTATION OF BREAST      BREAST SURGERY       SECTION      RHINOPLASTY      TUBAL LIGATION       Family History   Problem Relation Age of Onset    Hypertension Mother     No Known Problems Father     Diabetes Paternal Grandmother        Review of patient's allergies indicates:  No Known Allergies    Current Outpatient Medications:     biotin 10,000 mcg Cap, Take by mouth once daily., Disp: , Rfl:     COLLAGEN MISC, by Misc.(Non-Drug; Combo Route) route., Disp: , Rfl:     methylPREDNISolone (MEDROL DOSEPACK) 4 mg tablet, use as directed,  "Disp: 21 each, Rfl: 0    multivitamin (THERAGRAN) per tablet, Take 1 tablet by mouth once daily., Disp: , Rfl:     tiZANidine (ZANAFLEX) 4 MG tablet, Take 1 tablet (4 mg total) by mouth every 6 (six) hours as needed., Disp: 30 tablet, Rfl: 0    Review of Systems   Constitutional: Negative for chills, fever and unexpected weight change.   Eyes: Negative for pain and visual disturbance.   Respiratory: Negative for cough and shortness of breath.    Cardiovascular: Negative for chest pain, palpitations and leg swelling.   Gastrointestinal: Negative for abdominal pain, diarrhea, nausea and vomiting.   Musculoskeletal: Positive for neck pain. Negative for arthralgias.        Left shoulder   Skin: Negative for rash.   Neurological: Negative for dizziness, weakness and headaches.   Psychiatric/Behavioral: Negative for agitation and sleep disturbance. The patient is not nervous/anxious.           Objective:      Vitals:    02/22/22 1409   BP: 122/80   Weight: 52.2 kg (115 lb)   Height: 4' 9" (1.448 m)     Physical Exam  Vitals and nursing note reviewed.   Constitutional:       Appearance: She is well-developed.   HENT:      Right Ear: External ear normal.      Left Ear: External ear normal.   Neck:      Vascular: No JVD.   Cardiovascular:      Rate and Rhythm: Normal rate and regular rhythm.      Heart sounds: No murmur heard.  Pulmonary:      Effort: Pulmonary effort is normal.      Breath sounds: Normal breath sounds.   Musculoskeletal:         General: No deformity.      Left shoulder: No bony tenderness. Decreased range of motion. Normal strength.      Cervical back: Neck supple. Decreased range of motion.        Back:    Lymphadenopathy:      Cervical: No cervical adenopathy.   Skin:     General: Skin is warm and dry.      Findings: No rash.   Neurological:      Mental Status: She is alert and oriented to person, place, and time.      Gait: Gait normal.   Psychiatric:         Speech: Speech normal.         Behavior: " Behavior normal.           Assessment:       1. Screening for colon cancer    2. Cervical radiculopathy    3. Cough    4. High risk medication use    5. Physical exam         Plan:       Screening for colon cancer  -     Ambulatory referral/consult to Gastroenterology; Future; Expected date: 03/01/2022    Cervical radiculopathy  -     X-Ray Cervical Spine Complete 5 view; Future; Expected date: 02/22/2022  -     X-Ray Chest PA And Lateral; Future; Expected date: 02/22/2022    Cough  -     X-Ray Chest PA And Lateral; Future; Expected date: 02/22/2022    High risk medication use    Physical exam    Other orders  -     methylPREDNISolone (MEDROL DOSEPACK) 4 mg tablet; use as directed  Dispense: 21 each; Refill: 0  -     tiZANidine (ZANAFLEX) 4 MG tablet; Take 1 tablet (4 mg total) by mouth every 6 (six) hours as needed.  Dispense: 30 tablet; Refill: 0      Follow up in about 2 weeks (around 3/8/2022), or if symptoms worsen or fail to improve, for medication management.        3/3/2022 Radha Souza

## 2022-06-22 ENCOUNTER — TELEPHONE (OUTPATIENT)
Dept: FAMILY MEDICINE | Facility: CLINIC | Age: 47
End: 2022-06-22

## 2022-06-22 RX ORDER — TIZANIDINE 4 MG/1
4 TABLET ORAL EVERY 6 HOURS PRN
Qty: 30 TABLET | Refills: 0 | Status: SHIPPED | OUTPATIENT
Start: 2022-06-22 | End: 2022-07-02

## 2022-06-22 NOTE — TELEPHONE ENCOUNTER
----- Message from Giovanna Ford sent at 6/22/2022 12:27 PM CDT -----  Patient called and stated that she need a refill of her muscle relaxer's called into [facility] (not sure which one her phone is going in and out unable to understand ) please give her a call

## 2022-08-03 RX ORDER — TIZANIDINE 4 MG/1
4 TABLET ORAL EVERY 6 HOURS PRN
Qty: 30 TABLET | Refills: 0 | Status: SHIPPED | OUTPATIENT
Start: 2022-08-03 | End: 2022-08-13

## 2022-08-03 NOTE — TELEPHONE ENCOUNTER
----- Message from Giovanna Ford sent at 8/3/2022  3:40 PM CDT -----  Patient called and stated that she need a refill of her muscle relaxer called into [facility] on [address] if any questions please give her a call at [phone]

## 2022-10-03 ENCOUNTER — OFFICE VISIT (OUTPATIENT)
Dept: FAMILY MEDICINE | Facility: CLINIC | Age: 47
End: 2022-10-03
Payer: COMMERCIAL

## 2022-10-03 VITALS
WEIGHT: 116 LBS | BODY MASS INDEX: 25.03 KG/M2 | HEIGHT: 57 IN | HEART RATE: 68 BPM | DIASTOLIC BLOOD PRESSURE: 68 MMHG | SYSTOLIC BLOOD PRESSURE: 112 MMHG

## 2022-10-03 DIAGNOSIS — Z00.00 WELLNESS EXAMINATION: ICD-10-CM

## 2022-10-03 DIAGNOSIS — Z00.00 PHYSICAL EXAM: ICD-10-CM

## 2022-10-03 DIAGNOSIS — Z79.899 HIGH RISK MEDICATION USE: ICD-10-CM

## 2022-10-03 DIAGNOSIS — Z12.39 ENCOUNTER FOR BREAST CANCER SCREENING OTHER THAN MAMMOGRAM: ICD-10-CM

## 2022-10-03 DIAGNOSIS — Z23 NEED FOR INFLUENZA VACCINATION: Primary | ICD-10-CM

## 2022-10-03 PROCEDURE — 1160F PR REVIEW ALL MEDS BY PRESCRIBER/CLIN PHARMACIST DOCUMENTED: ICD-10-PCS | Mod: CPTII,S$GLB,, | Performed by: NURSE PRACTITIONER

## 2022-10-03 PROCEDURE — 90471 FLU VACCINE - QUADRIVALENT (RECOMBINANT) PRESERVATIVE FREE: ICD-10-PCS | Mod: S$GLB,,, | Performed by: NURSE PRACTITIONER

## 2022-10-03 PROCEDURE — 90682 RIV4 VACC RECOMBINANT DNA IM: CPT | Mod: S$GLB,,, | Performed by: NURSE PRACTITIONER

## 2022-10-03 PROCEDURE — 99396 PREV VISIT EST AGE 40-64: CPT | Mod: 25,S$GLB,, | Performed by: NURSE PRACTITIONER

## 2022-10-03 PROCEDURE — 1160F RVW MEDS BY RX/DR IN RCRD: CPT | Mod: CPTII,S$GLB,, | Performed by: NURSE PRACTITIONER

## 2022-10-03 PROCEDURE — 90682 FLU VACCINE - QUADRIVALENT (RECOMBINANT) PRESERVATIVE FREE: ICD-10-PCS | Mod: S$GLB,,, | Performed by: NURSE PRACTITIONER

## 2022-10-03 PROCEDURE — 3008F PR BODY MASS INDEX (BMI) DOCUMENTED: ICD-10-PCS | Mod: CPTII,S$GLB,, | Performed by: NURSE PRACTITIONER

## 2022-10-03 PROCEDURE — 90471 IMMUNIZATION ADMIN: CPT | Mod: S$GLB,,, | Performed by: NURSE PRACTITIONER

## 2022-10-03 PROCEDURE — 99396 PR PREVENTIVE VISIT,EST,40-64: ICD-10-PCS | Mod: 25,S$GLB,, | Performed by: NURSE PRACTITIONER

## 2022-10-03 PROCEDURE — 3008F BODY MASS INDEX DOCD: CPT | Mod: CPTII,S$GLB,, | Performed by: NURSE PRACTITIONER

## 2022-10-03 PROCEDURE — 1159F PR MEDICATION LIST DOCUMENTED IN MEDICAL RECORD: ICD-10-PCS | Mod: CPTII,S$GLB,, | Performed by: NURSE PRACTITIONER

## 2022-10-03 PROCEDURE — 1159F MED LIST DOCD IN RCRD: CPT | Mod: CPTII,S$GLB,, | Performed by: NURSE PRACTITIONER

## 2022-10-03 NOTE — PROGRESS NOTES
SUBJECTIVE:    Patient ID: Yulisa Castano is a 47 y.o. female.    Chief Complaint: Follow-up (Takes no meds, C-scope referral in 2/2022, Flu wants// SW)    47 year old female presents for wellness visit. Taking mvi daily. No significant medical history other than kidney stones.   Sleeps well. works at [employer].  Tries tto eat healthy. Will have labs performed today. Would like flu shot today    Office Visit on 10/03/2022   Component Date Value Ref Range Status    WBC 10/03/2022 5.7  3.8 - 10.8 Thousand/uL Final    RBC 10/03/2022 4.21  3.80 - 5.10 Million/uL Final    Hemoglobin 10/03/2022 12.8  11.7 - 15.5 g/dL Final    Hematocrit 10/03/2022 39.7  35.0 - 45.0 % Final    MCV 10/03/2022 94.3  80.0 - 100.0 fL Final    MCH 10/03/2022 30.4  27.0 - 33.0 pg Final    MCHC 10/03/2022 32.2  32.0 - 36.0 g/dL Final    RDW 10/03/2022 12.4  11.0 - 15.0 % Final    Platelets 10/03/2022 293  140 - 400 Thousand/uL Final    MPV 10/03/2022 9.5  7.5 - 12.5 fL Final    Neutrophils, Abs 10/03/2022 2,434  1,500 - 7,800 cells/uL Final    Lymph # 10/03/2022 2,651  850 - 3,900 cells/uL Final    Mono # 10/03/2022 279  200 - 950 cells/uL Final    Eos # 10/03/2022 257  15 - 500 cells/uL Final    Baso # 10/03/2022 80  0 - 200 cells/uL Final    Neutrophils Relative 10/03/2022 42.7  % Final    Lymph % 10/03/2022 46.5  % Final    Mono % 10/03/2022 4.9  % Final    Eosinophil % 10/03/2022 4.5  % Final    Basophil % 10/03/2022 1.4  % Final    Glucose 10/03/2022  93  65 - 99 mg/dL Final    BUN 10/03/2022 12  7 - 25 mg/dL Final    Creatinine 10/03/2022 0.71  0.50 - 0.99 mg/dL Final    EGFR 10/03/2022 105  > OR = 60 mL/min/1.73m2 Final    BUN/Creatinine Ratio 10/03/2022 NOT APPLICABLE  6 - 22 (calc) Final    Sodium 10/03/2022 139  135 - 146 mmol/L Final    Potassium 10/03/2022 4.6  3.5 - 5.3 mmol/L Final    Chloride 10/03/2022 103  98 - 110 mmol/L Final    CO2 10/03/2022 29  20 - 32 mmol/L Final    Calcium 10/03/2022 9.7  8.6 - 10.2 mg/dL Final    Total Protein 10/03/2022 7.3  6.1 - 8.1 g/dL Final    Albumin 10/03/2022 4.6  3.6 - 5.1 g/dL Final    Globulin, Total 10/03/2022 2.7  1.9 - 3.7 g/dL (calc) Final    Albumin/Globulin Ratio 10/03/2022 1.7  1.0 - 2.5 (calc) Final    Total Bilirubin 10/03/2022 0.4  0.2 - 1.2 mg/dL Final    Alkaline Phosphatase 10/03/2022 39  31 - 125 U/L Final    AST 10/03/2022 15  10 - 35 U/L Final    ALT 10/03/2022 12  6 - 29 U/L Final    TSH w/reflex to FT4 10/03/2022 1.03  mIU/L Final    Color, UA 10/03/2022 YELLOW  YELLOW Final    Appearance, UA 10/03/2022 CLEAR  CLEAR Final    Specific Gravity, UA 10/03/2022 1.015  1.001 - 1.035 Final    pH, UA 10/03/2022 6.5  5.0 - 8.0 Final    Glucose, UA 10/03/2022 NEGATIVE  NEGATIVE Final    Bilirubin, UA 10/03/2022 NEGATIVE  NEGATIVE Final    Ketones, UA 10/03/2022 NEGATIVE  NEGATIVE Final    Occult Blood UA 10/03/2022 TRACE (A)  NEGATIVE Final    Protein, UA 10/03/2022 NEGATIVE  NEGATIVE Final    Nitrite, UA 10/03/2022 NEGATIVE  NEGATIVE Final    Leukocytes, UA 10/03/2022 NEGATIVE  NEGATIVE Final    WBC Casts, UA 10/03/2022 NONE SEEN  < OR = 5 /HPF Final    RBC Casts, UA 10/03/2022 NONE SEEN  < OR = 2 /HPF Final    Squam Epithel, UA 10/03/2022 0-5  < OR = 5 /HPF Final    Bacteria, UA 10/03/2022 MODERATE (A)  NONE SEEN /HPF Final    Hyaline Casts, UA 10/03/2022 NONE SEEN  NONE SEEN /LPF Final    Service Cmt: 10/03/2022    Final    Reflexive Urine Culture 10/03/2022    Final    Cholesterol 10/03/2022 208 (H)   <200 mg/dL Final    HDL 10/03/2022 69  > OR = 50 mg/dL Final    Triglycerides 10/03/2022 86  <150 mg/dL Final    LDL Cholesterol 10/03/2022 120 (H)  mg/dL (calc) Final    HDL/Cholesterol Ratio 10/03/2022 3.0  <5.0 (calc) Final    Non HDL Chol. (LDL+VLDL) 10/03/2022 139 (H)  <130 mg/dL (calc) Final       Past Medical History:   Diagnosis Date    Kidney stone      Social History     Socioeconomic History    Marital status:    Occupational History    Occupation: human resources   Tobacco Use    Smoking status: Never    Smokeless tobacco: Never   Substance and Sexual Activity    Alcohol use: Yes     Comment: once a week    Drug use: No    Sexual activity: Yes     Birth control/protection: Other-see comments     Comment: Tubal -    Social History Narrative    Sleeps pretty good.      Past Surgical History:   Procedure Laterality Date    AUGMENTATION OF BREAST      BREAST SURGERY       SECTION      RHINOPLASTY      TUBAL LIGATION       Family History   Problem Relation Age of Onset    Hypertension Mother     No Known Problems Father     Diabetes Paternal Grandmother        Review of patient's allergies indicates:  No Known Allergies    Current Outpatient Medications:     biotin 10,000 mcg Cap, Take by mouth once daily., Disp: , Rfl:     COLLAGEN MISC, by Misc.(Non-Drug; Combo Route) route., Disp: , Rfl:     multivitamin (THERAGRAN) per tablet, Take 1 tablet by mouth once daily., Disp: , Rfl:     Review of Systems   Constitutional:  Negative for chills, fever and unexpected weight change.   HENT:  Negative for ear pain, rhinorrhea and sore throat.    Eyes:  Negative for pain and visual disturbance.   Respiratory:  Negative for cough and shortness of breath.    Cardiovascular:  Negative for chest pain, palpitations and leg swelling.   Gastrointestinal:  Negative for abdominal pain, diarrhea, nausea and vomiting.   Genitourinary:  Negative for difficulty urinating, hematuria and vaginal bleeding.  "  Musculoskeletal:  Negative for arthralgias.   Skin:  Negative for rash.   Neurological:  Negative for dizziness, weakness and headaches.   Psychiatric/Behavioral:  Negative for agitation and sleep disturbance. The patient is not nervous/anxious.         Objective:      Vitals:    10/03/22 0847   BP: 112/68   Pulse: 68   Weight: 52.6 kg (116 lb)   Height: 4' 9" (1.448 m)     Physical Exam  Vitals and nursing note reviewed.   Constitutional:       General: She is not in acute distress.     Appearance: Normal appearance. She is well-developed.   HENT:      Head: Normocephalic.      Right Ear: External ear normal.      Left Ear: External ear normal.   Eyes:      Conjunctiva/sclera: Conjunctivae normal.      Pupils: Pupils are equal, round, and reactive to light.   Neck:      Vascular: No JVD.   Cardiovascular:      Rate and Rhythm: Normal rate and regular rhythm.      Heart sounds: No murmur heard.  Pulmonary:      Effort: Pulmonary effort is normal.      Breath sounds: Normal breath sounds.   Abdominal:      General: Bowel sounds are normal.      Palpations: Abdomen is soft.   Musculoskeletal:         General: No deformity. Normal range of motion.      Cervical back: Normal range of motion and neck supple.   Lymphadenopathy:      Cervical: No cervical adenopathy.   Skin:     General: Skin is warm and dry.      Findings: No rash.   Neurological:      Mental Status: She is alert and oriented to person, place, and time.      Gait: Gait normal.   Psychiatric:         Speech: Speech normal.         Behavior: Behavior normal.         Assessment:       1. Need for influenza vaccination    2. Physical exam    3. High risk medication use    4. Encounter for breast cancer screening other than mammogram    5. Wellness examination           Plan:       Need for influenza vaccination  -     Influenza - Quadrivalent (Recombinant) (PF)    Physical exam  -     CBC Auto Differential; Future; Expected date: 10/03/2022  -     " Comprehensive Metabolic Panel; Future; Expected date: 10/03/2022  -     TSH w/reflex to FT4; Future; Expected date: 10/03/2022  -     Urinalysis, Reflex to Urine Culture Urine, Clean Catch; Future; Expected date: 10/03/2022  -     Lipid Panel; Future; Expected date: 10/03/2022    High risk medication use  -     CBC Auto Differential; Future; Expected date: 10/03/2022  -     Comprehensive Metabolic Panel; Future; Expected date: 10/03/2022  -     TSH w/reflex to FT4; Future; Expected date: 10/03/2022  -     Urinalysis, Reflex to Urine Culture Urine, Clean Catch; Future; Expected date: 10/03/2022  -     Lipid Panel; Future; Expected date: 10/03/2022    Encounter for breast cancer screening other than mammogram  -     Mammo Digital Screening Bilat w/ Dre; Future; Expected date: 10/03/2022    Wellness examination    Follow up in about 1 year (around 10/3/2023) for medication management.        10/11/2022 Radha Souza

## 2022-10-04 LAB
ALBUMIN SERPL-MCNC: 4.6 G/DL (ref 3.6–5.1)
ALBUMIN/GLOB SERPL: 1.7 (CALC) (ref 1–2.5)
ALP SERPL-CCNC: 39 U/L (ref 31–125)
ALT SERPL-CCNC: 12 U/L (ref 6–29)
APPEARANCE UR: CLEAR
AST SERPL-CCNC: 15 U/L (ref 10–35)
BACTERIA #/AREA URNS HPF: ABNORMAL /HPF
BACTERIA UR CULT: ABNORMAL
BASOPHILS # BLD AUTO: 80 CELLS/UL (ref 0–200)
BASOPHILS NFR BLD AUTO: 1.4 %
BILIRUB SERPL-MCNC: 0.4 MG/DL (ref 0.2–1.2)
BILIRUB UR QL STRIP: NEGATIVE
BUN SERPL-MCNC: 12 MG/DL (ref 7–25)
BUN/CREAT SERPL: NORMAL (CALC) (ref 6–22)
CALCIUM SERPL-MCNC: 9.7 MG/DL (ref 8.6–10.2)
CHLORIDE SERPL-SCNC: 103 MMOL/L (ref 98–110)
CHOLEST SERPL-MCNC: 208 MG/DL
CHOLEST/HDLC SERPL: 3 (CALC)
CO2 SERPL-SCNC: 29 MMOL/L (ref 20–32)
COLOR UR: YELLOW
CREAT SERPL-MCNC: 0.71 MG/DL (ref 0.5–0.99)
EGFR: 105 ML/MIN/1.73M2
EOSINOPHIL # BLD AUTO: 257 CELLS/UL (ref 15–500)
EOSINOPHIL NFR BLD AUTO: 4.5 %
ERYTHROCYTE [DISTWIDTH] IN BLOOD BY AUTOMATED COUNT: 12.4 % (ref 11–15)
GLOBULIN SER CALC-MCNC: 2.7 G/DL (CALC) (ref 1.9–3.7)
GLUCOSE SERPL-MCNC: 93 MG/DL (ref 65–99)
GLUCOSE UR QL STRIP: NEGATIVE
HCT VFR BLD AUTO: 39.7 % (ref 35–45)
HDLC SERPL-MCNC: 69 MG/DL
HGB BLD-MCNC: 12.8 G/DL (ref 11.7–15.5)
HGB UR QL STRIP: ABNORMAL
HYALINE CASTS #/AREA URNS LPF: ABNORMAL /LPF
KETONES UR QL STRIP: NEGATIVE
LDLC SERPL CALC-MCNC: 120 MG/DL (CALC)
LEUKOCYTE ESTERASE UR QL STRIP: NEGATIVE
LYMPHOCYTES # BLD AUTO: 2651 CELLS/UL (ref 850–3900)
LYMPHOCYTES NFR BLD AUTO: 46.5 %
MCH RBC QN AUTO: 30.4 PG (ref 27–33)
MCHC RBC AUTO-ENTMCNC: 32.2 G/DL (ref 32–36)
MCV RBC AUTO: 94.3 FL (ref 80–100)
MONOCYTES # BLD AUTO: 279 CELLS/UL (ref 200–950)
MONOCYTES NFR BLD AUTO: 4.9 %
NEUTROPHILS # BLD AUTO: 2434 CELLS/UL (ref 1500–7800)
NEUTROPHILS NFR BLD AUTO: 42.7 %
NITRITE UR QL STRIP: NEGATIVE
NONHDLC SERPL-MCNC: 139 MG/DL (CALC)
PH UR STRIP: 6.5 [PH] (ref 5–8)
PLATELET # BLD AUTO: 293 THOUSAND/UL (ref 140–400)
PMV BLD REES-ECKER: 9.5 FL (ref 7.5–12.5)
POTASSIUM SERPL-SCNC: 4.6 MMOL/L (ref 3.5–5.3)
PROT SERPL-MCNC: 7.3 G/DL (ref 6.1–8.1)
PROT UR QL STRIP: NEGATIVE
RBC # BLD AUTO: 4.21 MILLION/UL (ref 3.8–5.1)
RBC #/AREA URNS HPF: ABNORMAL /HPF
SERVICE CMNT-IMP: ABNORMAL
SODIUM SERPL-SCNC: 139 MMOL/L (ref 135–146)
SP GR UR STRIP: 1.01 (ref 1–1.03)
SQUAMOUS #/AREA URNS HPF: ABNORMAL /HPF
TRIGL SERPL-MCNC: 86 MG/DL
TSH SERPL-ACNC: 1.03 MIU/L
WBC # BLD AUTO: 5.7 THOUSAND/UL (ref 3.8–10.8)
WBC #/AREA URNS HPF: ABNORMAL /HPF

## 2022-10-11 ENCOUNTER — TELEPHONE (OUTPATIENT)
Dept: FAMILY MEDICINE | Facility: CLINIC | Age: 47
End: 2022-10-11

## 2022-10-11 NOTE — TELEPHONE ENCOUNTER
----- Message from Radha Souza NP sent at 10/11/2022 12:26 AM CDT -----  Cholesterol has increased slightly since last lab draw. Start low cholesterol diet and exercise. Rest of labs are normal

## 2022-10-19 ENCOUNTER — TELEPHONE (OUTPATIENT)
Dept: FAMILY MEDICINE | Facility: CLINIC | Age: 47
End: 2022-10-19

## 2022-10-19 NOTE — TELEPHONE ENCOUNTER
----- Message from Giovanna Ford sent at 10/19/2022 10:29 AM CDT -----  Patient called and stated that she would like to know if her paperwork has been filled out for her job please give her a call back at [phone]

## 2023-02-27 RX ORDER — TIZANIDINE 4 MG/1
4 TABLET ORAL EVERY 8 HOURS PRN
Qty: 30 TABLET | Refills: 0 | Status: SHIPPED | OUTPATIENT
Start: 2023-02-27 | End: 2023-03-09

## 2023-02-27 NOTE — TELEPHONE ENCOUNTER
Last ov: 10/3/22  Next ov: 10/3/23  Last written per chart:  8/3/22    Pt notified Radha is put of the office now and tomorrow. Rx request will have to be sent to other provider and it's up to the provider to fill. If denied she would have to wait until Radha comes in Wednesday. Pt acknowledge understanding with no complaints.

## 2023-02-27 NOTE — TELEPHONE ENCOUNTER
----- Message from Amaris Booker sent at 2/27/2023  3:14 PM CST -----  Refill on: ZANAFLEX    [facility] [address] [place] [address] - [id number] [address]    [phone]

## 2023-03-30 ENCOUNTER — HOSPITAL ENCOUNTER (OUTPATIENT)
Dept: RADIOLOGY | Facility: HOSPITAL | Age: 48
Discharge: HOME | End: 2023-03-30
Attending: NURSE PRACTITIONER
Payer: COMMERCIAL

## 2023-03-30 DIAGNOSIS — Z12.39 ENCOUNTER FOR BREAST CANCER SCREENING OTHER THAN MAMMOGRAM: ICD-10-CM

## 2023-03-30 PROCEDURE — 77063 BREAST TOMOSYNTHESIS BI: CPT | Mod: 26,,, | Performed by: RADIOLOGY

## 2023-03-30 PROCEDURE — 77067 MAMMO DIGITAL SCREENING BILAT WITH TOMO: ICD-10-PCS | Mod: 26,,, | Performed by: RADIOLOGY

## 2023-03-30 PROCEDURE — 77063 MAMMO DIGITAL SCREENING BILAT WITH TOMO: ICD-10-PCS | Mod: 26,,, | Performed by: RADIOLOGY

## 2023-03-30 PROCEDURE — 77067 SCR MAMMO BI INCL CAD: CPT | Mod: TC

## 2023-03-30 PROCEDURE — 77067 SCR MAMMO BI INCL CAD: CPT | Mod: 26,,, | Performed by: RADIOLOGY

## 2023-04-11 ENCOUNTER — PATIENT MESSAGE (OUTPATIENT)
Dept: ADMINISTRATIVE | Facility: HOSPITAL | Age: 48
End: 2023-04-11
Payer: COMMERCIAL

## 2023-04-11 DIAGNOSIS — Z12.11 SCREENING FOR COLON CANCER: Primary | ICD-10-CM

## 2023-04-29 ENCOUNTER — PATIENT MESSAGE (OUTPATIENT)
Dept: ADMINISTRATIVE | Facility: HOSPITAL | Age: 48
End: 2023-04-29
Payer: COMMERCIAL

## 2023-07-14 LAB — CRC RECOMMENDATION EXT: NORMAL

## 2023-07-19 ENCOUNTER — PATIENT OUTREACH (OUTPATIENT)
Dept: ADMINISTRATIVE | Facility: HOSPITAL | Age: 48
End: 2023-07-19
Payer: COMMERCIAL

## 2023-10-02 ENCOUNTER — TELEPHONE (OUTPATIENT)
Dept: FAMILY MEDICINE | Facility: CLINIC | Age: 48
End: 2023-10-02

## 2023-10-02 DIAGNOSIS — Z00.00 WELLNESS EXAMINATION: ICD-10-CM

## 2023-10-02 DIAGNOSIS — M54.12 CERVICAL RADICULOPATHY: ICD-10-CM

## 2023-10-02 DIAGNOSIS — Z79.899 HIGH RISK MEDICATION USE: Primary | ICD-10-CM

## 2023-10-02 NOTE — TELEPHONE ENCOUNTER
----- Message from Harry Almanza MA sent at 10/2/2023 12:31 PM CDT -----  Regarding: requesting standing labs  [phone]  pt has a f/u for 10/3/23 pt is requesting  lab orders be in the system for her visit tomorrow she take care of them before visit on 10/3/23

## 2023-10-03 ENCOUNTER — OFFICE VISIT (OUTPATIENT)
Dept: FAMILY MEDICINE | Facility: CLINIC | Age: 48
End: 2023-10-03
Payer: COMMERCIAL

## 2023-10-03 VITALS
OXYGEN SATURATION: 100 % | WEIGHT: 107.81 LBS | HEART RATE: 56 BPM | DIASTOLIC BLOOD PRESSURE: 80 MMHG | HEIGHT: 61 IN | BODY MASS INDEX: 20.35 KG/M2 | SYSTOLIC BLOOD PRESSURE: 120 MMHG

## 2023-10-03 DIAGNOSIS — Z00.00 PHYSICAL EXAM: ICD-10-CM

## 2023-10-03 DIAGNOSIS — Z79.899 HIGH RISK MEDICATION USE: ICD-10-CM

## 2023-10-03 DIAGNOSIS — Z23 NEED FOR INFLUENZA VACCINATION: Primary | ICD-10-CM

## 2023-10-03 PROCEDURE — 1160F RVW MEDS BY RX/DR IN RCRD: CPT | Mod: CPTII,S$GLB,, | Performed by: NURSE PRACTITIONER

## 2023-10-03 PROCEDURE — 3074F SYST BP LT 130 MM HG: CPT | Mod: CPTII,S$GLB,, | Performed by: NURSE PRACTITIONER

## 2023-10-03 PROCEDURE — 3008F PR BODY MASS INDEX (BMI) DOCUMENTED: ICD-10-PCS | Mod: CPTII,S$GLB,, | Performed by: NURSE PRACTITIONER

## 2023-10-03 PROCEDURE — 3079F PR MOST RECENT DIASTOLIC BLOOD PRESSURE 80-89 MM HG: ICD-10-PCS | Mod: CPTII,S$GLB,, | Performed by: NURSE PRACTITIONER

## 2023-10-03 PROCEDURE — 90471 IMMUNIZATION ADMIN: CPT | Mod: S$GLB,,, | Performed by: NURSE PRACTITIONER

## 2023-10-03 PROCEDURE — 3074F PR MOST RECENT SYSTOLIC BLOOD PRESSURE < 130 MM HG: ICD-10-PCS | Mod: CPTII,S$GLB,, | Performed by: NURSE PRACTITIONER

## 2023-10-03 PROCEDURE — 3008F BODY MASS INDEX DOCD: CPT | Mod: CPTII,S$GLB,, | Performed by: NURSE PRACTITIONER

## 2023-10-03 PROCEDURE — 1160F PR REVIEW ALL MEDS BY PRESCRIBER/CLIN PHARMACIST DOCUMENTED: ICD-10-PCS | Mod: CPTII,S$GLB,, | Performed by: NURSE PRACTITIONER

## 2023-10-03 PROCEDURE — 99396 PREV VISIT EST AGE 40-64: CPT | Mod: 25,S$GLB,, | Performed by: NURSE PRACTITIONER

## 2023-10-03 PROCEDURE — 99396 PR PREVENTIVE VISIT,EST,40-64: ICD-10-PCS | Mod: 25,S$GLB,, | Performed by: NURSE PRACTITIONER

## 2023-10-03 PROCEDURE — 90686 FLU VACCINE (QUAD) GREATER THAN OR EQUAL TO 3YO PRESERVATIVE FREE IM: ICD-10-PCS | Mod: S$GLB,,, | Performed by: NURSE PRACTITIONER

## 2023-10-03 PROCEDURE — 90686 IIV4 VACC NO PRSV 0.5 ML IM: CPT | Mod: S$GLB,,, | Performed by: NURSE PRACTITIONER

## 2023-10-03 PROCEDURE — 1159F MED LIST DOCD IN RCRD: CPT | Mod: CPTII,S$GLB,, | Performed by: NURSE PRACTITIONER

## 2023-10-03 PROCEDURE — 3079F DIAST BP 80-89 MM HG: CPT | Mod: CPTII,S$GLB,, | Performed by: NURSE PRACTITIONER

## 2023-10-03 PROCEDURE — 90471 FLU VACCINE (QUAD) GREATER THAN OR EQUAL TO 3YO PRESERVATIVE FREE IM: ICD-10-PCS | Mod: S$GLB,,, | Performed by: NURSE PRACTITIONER

## 2023-10-03 PROCEDURE — 1159F PR MEDICATION LIST DOCUMENTED IN MEDICAL RECORD: ICD-10-PCS | Mod: CPTII,S$GLB,, | Performed by: NURSE PRACTITIONER

## 2023-10-03 NOTE — PROGRESS NOTES
SUBJECTIVE:    Patient ID: Yulisa Castano is a 48 y.o. female.    Chief Complaint: Annual Exam (No medications// Pt here for annual physical and labs//Pt would like flu vaccine//JL)    48 year old female presents for wellness visit. Taking mvi daily. No significant medical history other than kidney stones.   Sleeps well. works at [employer].  Tries tto eat healthy. Had colonoscopy 7/23. 3 year follow up. Plans to have labs today. Last mammogram 3/23        Telephone on 10/02/2023   Component Date Value Ref Range Status    WBC 10/03/2023 6.3  3.8 - 10.8 Thousand/uL Final    RBC 10/03/2023 4.36  3.80 - 5.10 Million/uL Final    Hemoglobin 10/03/2023 13.4  11.7 - 15.5 g/dL Final    Hematocrit 10/03/2023 41.5  35.0 - 45.0 % Final    MCV 10/03/2023 95.2  80.0 - 100.0 fL Final    MCH 10/03/2023 30.7  27.0 - 33.0 pg Final    MCHC 10/03/2023 32.3  32.0 - 36.0 g/dL Final    RDW 10/03/2023 12.2  11.0 - 15.0 % Final    Platelets 10/03/2023 307  140 - 400 Thousand/uL Final    MPV 10/03/2023 10.4  7.5 - 12.5 fL Final    Neutrophils, Abs 10/03/2023 2,570  1,500 - 7,800 cells/uL Final    Lymph # 10/03/2023 3,163  850 - 3,900 cells/uL Final    Mono # 10/03/2023 233  200 - 950 cells/uL Final    Eos # 10/03/2023 252  15 - 500 cells/uL Final    Baso # 10/03/2023 82  0 - 200 cells/uL Final    Neutrophils Relative 10/03/2023 40.8  % Final    Lymph % 10/03/2023 50.2  % Final    Mono % 10/03/2023 3.7  % Final    Eosinophil % 10/03/2023 4.0  % Final    Basophil % 10/03/2023 1.3  % Final    Differential Comment 10/03/2023 Review of peripheral smear confirms automated results.   Final    Glucose 10/03/2023 87  65 - 99 mg/dL Final    BUN 10/03/2023 12  7 - 25 mg/dL Final    Creatinine 10/03/2023 0.67  0.50 - 0.99 mg/dL Final    eGFR 10/03/2023 108  > OR = 60 mL/min/1.73m2 Final    BUN/Creatinine Ratio 10/03/2023 SEE NOTE:  6 - 22 (calc) Final    Sodium 10/03/2023 139  135 - 146 mmol/L Final    Potassium 10/03/2023 4.8  3.5 - 5.3  mmol/L Final    Chloride 10/03/2023 103  98 - 110 mmol/L Final    CO2 10/03/2023 28  20 - 32 mmol/L Final    Calcium 10/03/2023 9.8  8.6 - 10.2 mg/dL Final    Total Protein 10/03/2023 7.8  6.1 - 8.1 g/dL Final    Albumin 10/03/2023 4.7  3.6 - 5.1 g/dL Final    Globulin, Total 10/03/2023 3.1  1.9 - 3.7 g/dL (calc) Final    Albumin/Globulin Ratio 10/03/2023 1.5  1.0 - 2.5 (calc) Final    Total Bilirubin 10/03/2023 0.5  0.2 - 1.2 mg/dL Final    Alkaline Phosphatase 10/03/2023 35  31 - 125 U/L Final    AST 10/03/2023 13  10 - 35 U/L Final    ALT 10/03/2023 10  6 - 29 U/L Final    Cholesterol 10/03/2023 214 (H)  <200 mg/dL Final    HDL 10/03/2023 70  > OR = 50 mg/dL Final    Triglycerides 10/03/2023 111  <150 mg/dL Final    LDL Cholesterol 10/03/2023 122 (H)  mg/dL (calc) Final    HDL/Cholesterol Ratio 10/03/2023 3.1  <5.0 (calc) Final    Non HDL Chol. (LDL+VLDL) 10/03/2023 144 (H)  <130 mg/dL (calc) Final    TSH w/reflex to FT4 10/03/2023 1.04  mIU/L Final    Color, UA 10/03/2023 YELLOW  YELLOW Final    Appearance, UA 10/03/2023 CLEAR  CLEAR Final    Specific Gravity, UA 10/03/2023 1.016  1.001 - 1.035 Final    pH, UA 10/03/2023 6.0  5.0 - 8.0 Final    Glucose, UA 10/03/2023 NEGATIVE  NEGATIVE Final    Bilirubin, UA 10/03/2023 NEGATIVE  NEGATIVE Final    Ketones, UA 10/03/2023 NEGATIVE  NEGATIVE Final    Occult Blood UA 10/03/2023 NEGATIVE  NEGATIVE Final    Protein, UA 10/03/2023 NEGATIVE  NEGATIVE Final    Nitrite, UA 10/03/2023 NEGATIVE  NEGATIVE Final    Leukocytes, UA 10/03/2023 NEGATIVE  NEGATIVE Final    WBC Casts, UA 10/03/2023 NONE SEEN  < OR = 5 /HPF Final    RBC Casts, UA 10/03/2023 NONE SEEN  < OR = 2 /HPF Final    Squam Epithel, UA 10/03/2023 0-5  < OR = 5 /HPF Final    Bacteria, UA 10/03/2023 FEW (A)  NONE SEEN /HPF Final    Hyaline Casts, UA 10/03/2023 NONE SEEN  NONE SEEN /LPF Final    Service Cmt: 10/03/2023    Final    Reflexive Urine Culture 10/03/2023    Final   Patient Outreach on 07/19/2023    Component Date Value Ref Range Status    CRC Recommendation External 2023 Repeat colonoscopy in 3 years   Final       Past Medical History:   Diagnosis Date    History of colonoscopy 2023    Kidney stone     Personal history of colonic polyps 2023     Social History     Socioeconomic History    Marital status:    Occupational History    Occupation: human resources   Tobacco Use    Smoking status: Never    Smokeless tobacco: Never   Substance and Sexual Activity    Alcohol use: Yes     Comment: once a week    Drug use: No    Sexual activity: Yes     Birth control/protection: Other-see comments     Comment: Tubal -    Social History Narrative    Sleeps pretty good.      Social Determinants of Health     Stress: Stress Concern Present (12/15/2020)    [facility] [employer] - Occupational Stress Questionnaire     Feeling of Stress : Rather much     Past Surgical History:   Procedure Laterality Date    AUGMENTATION OF BREAST Bilateral         BREAST SURGERY       SECTION      COLONOSCOPY  2023    RHINOPLASTY      TUBAL LIGATION       Family History   Problem Relation Age of Onset    Hypertension Mother     No Known Problems Father     Diabetes Paternal Grandmother        All of your core healthy metrics are met.      Review of patient's allergies indicates:  No Known Allergies    Current Outpatient Medications:     biotin 10,000 mcg Cap, Take by mouth once daily., Disp: , Rfl:     COLLAGEN MISC, by Misc.(Non-Drug; Combo Route) route., Disp: , Rfl:     multivitamin (THERAGRAN) per tablet, Take 1 tablet by mouth once daily., Disp: , Rfl:     Review of Systems   Constitutional:  Negative for chills, fever and unexpected weight change.   HENT:  Negative for ear pain, rhinorrhea and sore throat.    Eyes:  Negative for pain and visual disturbance.   Respiratory:  Negative for cough and shortness of breath.    Cardiovascular:  Negative for chest pain,  "palpitations and leg swelling.   Gastrointestinal:  Negative for abdominal pain, diarrhea, nausea and vomiting.   Genitourinary:  Negative for difficulty urinating, hematuria and vaginal bleeding.   Musculoskeletal:  Negative for arthralgias.   Skin:  Negative for rash.   Neurological:  Negative for dizziness, weakness and headaches.   Psychiatric/Behavioral:  Negative for agitation and sleep disturbance. The patient is not nervous/anxious.           Objective:      Vitals:    10/03/23 1007   BP: 120/80   Pulse: (!) 56   SpO2: 100%   Weight: 48.9 kg (107 lb 12.8 oz)   Height: 5' 0.5" (1.537 m)     Physical Exam  Vitals and nursing note reviewed.   Constitutional:       General: She is not in acute distress.     Appearance: Normal appearance. She is well-developed.   HENT:      Head: Normocephalic.      Right Ear: External ear normal.      Left Ear: External ear normal.   Eyes:      Conjunctiva/sclera: Conjunctivae normal.      Pupils: Pupils are equal, round, and reactive to light.   Neck:      Vascular: No JVD.   Cardiovascular:      Rate and Rhythm: Normal rate and regular rhythm.      Heart sounds: No murmur heard.  Pulmonary:      Effort: Pulmonary effort is normal.      Breath sounds: Normal breath sounds.   Abdominal:      General: Bowel sounds are normal.      Palpations: Abdomen is soft.   Musculoskeletal:         General: No deformity. Normal range of motion.      Cervical back: Normal range of motion and neck supple.   Lymphadenopathy:      Cervical: No cervical adenopathy.   Skin:     General: Skin is warm and dry.      Findings: No rash.   Neurological:      Mental Status: She is alert and oriented to person, place, and time.      Gait: Gait normal.   Psychiatric:         Speech: Speech normal.         Behavior: Behavior normal.           Assessment:       1. Need for influenza vaccination    2. Physical exam    3. High risk medication use         Plan:       Need for influenza vaccination  -     " Influenza - Quadrivalent *Preferred* (6 months+) (PF)    Physical exam  Comments:  labs today    High risk medication use      Follow up in about 1 year (around 10/3/2024), or if symptoms worsen or fail to improve, for medication management.        10/12/2023 Radha Souza

## 2023-10-05 ENCOUNTER — TELEPHONE (OUTPATIENT)
Dept: FAMILY MEDICINE | Facility: CLINIC | Age: 48
End: 2023-10-05

## 2023-10-05 LAB
ALBUMIN SERPL-MCNC: 4.7 G/DL (ref 3.6–5.1)
ALBUMIN/GLOB SERPL: 1.5 (CALC) (ref 1–2.5)
ALP SERPL-CCNC: 35 U/L (ref 31–125)
ALT SERPL-CCNC: 10 U/L (ref 6–29)
APPEARANCE UR: CLEAR
AST SERPL-CCNC: 13 U/L (ref 10–35)
BACTERIA #/AREA URNS HPF: ABNORMAL /HPF
BACTERIA UR CULT: ABNORMAL
BASOPHILS # BLD AUTO: 82 CELLS/UL (ref 0–200)
BASOPHILS NFR BLD AUTO: 1.3 %
BILIRUB SERPL-MCNC: 0.5 MG/DL (ref 0.2–1.2)
BILIRUB UR QL STRIP: NEGATIVE
BUN SERPL-MCNC: 12 MG/DL (ref 7–25)
BUN/CREAT SERPL: NORMAL (CALC) (ref 6–22)
CALCIUM SERPL-MCNC: 9.8 MG/DL (ref 8.6–10.2)
CHLORIDE SERPL-SCNC: 103 MMOL/L (ref 98–110)
CHOLEST SERPL-MCNC: 214 MG/DL
CHOLEST/HDLC SERPL: 3.1 (CALC)
CO2 SERPL-SCNC: 28 MMOL/L (ref 20–32)
COLOR UR: YELLOW
CREAT SERPL-MCNC: 0.67 MG/DL (ref 0.5–0.99)
EGFR: 108 ML/MIN/1.73M2
EOSINOPHIL # BLD AUTO: 252 CELLS/UL (ref 15–500)
EOSINOPHIL NFR BLD AUTO: 4 %
ERYTHROCYTE [DISTWIDTH] IN BLOOD BY AUTOMATED COUNT: 12.2 % (ref 11–15)
GLOBULIN SER CALC-MCNC: 3.1 G/DL (CALC) (ref 1.9–3.7)
GLUCOSE SERPL-MCNC: 87 MG/DL (ref 65–99)
GLUCOSE UR QL STRIP: NEGATIVE
HCT VFR BLD AUTO: 41.5 % (ref 35–45)
HDLC SERPL-MCNC: 70 MG/DL
HGB BLD-MCNC: 13.4 G/DL (ref 11.7–15.5)
HGB UR QL STRIP: NEGATIVE
HYALINE CASTS #/AREA URNS LPF: ABNORMAL /LPF
KETONES UR QL STRIP: NEGATIVE
LDLC SERPL CALC-MCNC: 122 MG/DL (CALC)
LEUKOCYTE ESTERASE UR QL STRIP: NEGATIVE
LYMPHOCYTES # BLD AUTO: 3163 CELLS/UL (ref 850–3900)
LYMPHOCYTES NFR BLD AUTO: 50.2 %
MCH RBC QN AUTO: 30.7 PG (ref 27–33)
MCHC RBC AUTO-ENTMCNC: 32.3 G/DL (ref 32–36)
MCV RBC AUTO: 95.2 FL (ref 80–100)
MONOCYTES # BLD AUTO: 233 CELLS/UL (ref 200–950)
MONOCYTES NFR BLD AUTO: 3.7 %
NEUTROPHILS # BLD AUTO: 2570 CELLS/UL (ref 1500–7800)
NEUTROPHILS NFR BLD AUTO: 40.8 %
NITRITE UR QL STRIP: NEGATIVE
NONHDLC SERPL-MCNC: 144 MG/DL (CALC)
PH UR STRIP: 6 [PH] (ref 5–8)
PLATELET # BLD AUTO: 307 THOUSAND/UL (ref 140–400)
PMV BLD REES-ECKER: 10.4 FL (ref 7.5–12.5)
POTASSIUM SERPL-SCNC: 4.8 MMOL/L (ref 3.5–5.3)
PROT SERPL-MCNC: 7.8 G/DL (ref 6.1–8.1)
PROT UR QL STRIP: NEGATIVE
RBC # BLD AUTO: 4.36 MILLION/UL (ref 3.8–5.1)
RBC #/AREA URNS HPF: ABNORMAL /HPF
SERVICE CMNT-IMP: ABNORMAL
SERVICE CMNT-IMP: NORMAL
SODIUM SERPL-SCNC: 139 MMOL/L (ref 135–146)
SP GR UR STRIP: 1.02 (ref 1–1.03)
SQUAMOUS #/AREA URNS HPF: ABNORMAL /HPF
TRIGL SERPL-MCNC: 111 MG/DL
TSH SERPL-ACNC: 1.04 MIU/L
WBC # BLD AUTO: 6.3 THOUSAND/UL (ref 3.8–10.8)
WBC #/AREA URNS HPF: ABNORMAL /HPF

## 2023-10-05 NOTE — TELEPHONE ENCOUNTER
----- Message from Noemy Campa sent at 10/5/2023  8:49 AM CDT -----  Pt dropped off physical form   [phone]

## 2023-10-12 ENCOUNTER — TELEPHONE (OUTPATIENT)
Dept: FAMILY MEDICINE | Facility: CLINIC | Age: 48
End: 2023-10-12

## 2023-10-12 NOTE — TELEPHONE ENCOUNTER
----- Message from Radha Souza NP sent at 10/12/2023  1:55 AM CDT -----  Cholesterol ok. Your healthy cholesterol is 70mg/dl which helps to protect the heart. Continue diet and exercise. Rest of labs are normal.

## 2023-12-14 LAB — PAP RECOMMENDATION EXT: NORMAL

## 2024-07-26 ENCOUNTER — HOSPITAL ENCOUNTER (OUTPATIENT)
Dept: RADIOLOGY | Facility: HOSPITAL | Age: 49
Discharge: HOME | End: 2024-07-26
Attending: NURSE PRACTITIONER
Payer: COMMERCIAL

## 2024-07-26 VITALS — HEIGHT: 61 IN | BODY MASS INDEX: 21.14 KG/M2 | WEIGHT: 112 LBS

## 2024-07-26 DIAGNOSIS — Z12.31 ENCOUNTER FOR SCREENING MAMMOGRAM FOR BREAST CANCER: ICD-10-CM

## 2024-07-26 PROCEDURE — 77063 BREAST TOMOSYNTHESIS BI: CPT | Mod: TC

## 2024-07-26 PROCEDURE — 77067 SCR MAMMO BI INCL CAD: CPT | Mod: TC

## 2024-07-26 PROCEDURE — 77067 SCR MAMMO BI INCL CAD: CPT | Mod: 26,,, | Performed by: RADIOLOGY

## 2024-07-26 PROCEDURE — 77063 BREAST TOMOSYNTHESIS BI: CPT | Mod: 26,,, | Performed by: RADIOLOGY

## 2024-08-14 ENCOUNTER — TELEPHONE (OUTPATIENT)
Dept: FAMILY MEDICINE | Facility: CLINIC | Age: 49
End: 2024-08-14
Payer: COMMERCIAL

## 2024-08-14 NOTE — TELEPHONE ENCOUNTER
Left detailed voice message regarding lab results and to contact clinic back if has further questions.

## 2024-10-15 ENCOUNTER — TELEPHONE (OUTPATIENT)
Dept: FAMILY MEDICINE | Facility: CLINIC | Age: 49
End: 2024-10-15
Payer: COMMERCIAL

## 2024-10-15 DIAGNOSIS — Z00.00 ROUTINE GENERAL MEDICAL EXAMINATION AT A HEALTH CARE FACILITY: Primary | ICD-10-CM

## 2024-11-05 ENCOUNTER — TELEPHONE (OUTPATIENT)
Dept: FAMILY MEDICINE | Facility: CLINIC | Age: 49
End: 2024-11-05
Payer: COMMERCIAL

## 2024-11-05 NOTE — TELEPHONE ENCOUNTER
----- Message from Matthew sent at 11/5/2024  7:46 AM CST -----  Pt dropped off form to be completed and faxed back. I put it on Devorah's desk.  [phone]

## 2024-11-07 ENCOUNTER — OFFICE VISIT (OUTPATIENT)
Dept: FAMILY MEDICINE | Facility: CLINIC | Age: 49
End: 2024-11-07
Payer: COMMERCIAL

## 2024-11-07 VITALS
WEIGHT: 114 LBS | OXYGEN SATURATION: 100 % | HEART RATE: 75 BPM | SYSTOLIC BLOOD PRESSURE: 110 MMHG | HEIGHT: 61 IN | DIASTOLIC BLOOD PRESSURE: 72 MMHG | BODY MASS INDEX: 21.52 KG/M2

## 2024-11-07 DIAGNOSIS — Z00.00 WELLNESS EXAMINATION: ICD-10-CM

## 2024-11-07 DIAGNOSIS — N95.1 PERIMENOPAUSE: ICD-10-CM

## 2024-11-07 DIAGNOSIS — Z23 NEED FOR INFLUENZA VACCINATION: Primary | ICD-10-CM

## 2024-11-07 PROCEDURE — 3074F SYST BP LT 130 MM HG: CPT | Mod: CPTII,S$GLB,, | Performed by: NURSE PRACTITIONER

## 2024-11-07 PROCEDURE — 99396 PREV VISIT EST AGE 40-64: CPT | Mod: 25,S$GLB,, | Performed by: NURSE PRACTITIONER

## 2024-11-07 PROCEDURE — 1160F RVW MEDS BY RX/DR IN RCRD: CPT | Mod: CPTII,S$GLB,, | Performed by: NURSE PRACTITIONER

## 2024-11-07 PROCEDURE — 1159F MED LIST DOCD IN RCRD: CPT | Mod: CPTII,S$GLB,, | Performed by: NURSE PRACTITIONER

## 2024-11-07 PROCEDURE — 3078F DIAST BP <80 MM HG: CPT | Mod: CPTII,S$GLB,, | Performed by: NURSE PRACTITIONER

## 2024-11-07 PROCEDURE — 3008F BODY MASS INDEX DOCD: CPT | Mod: CPTII,S$GLB,, | Performed by: NURSE PRACTITIONER

## 2024-11-07 PROCEDURE — 90471 IMMUNIZATION ADMIN: CPT | Mod: S$GLB,,, | Performed by: NURSE PRACTITIONER

## 2024-11-07 PROCEDURE — 90656 IIV3 VACC NO PRSV 0.5 ML IM: CPT | Mod: S$GLB,,, | Performed by: NURSE PRACTITIONER

## 2024-11-07 NOTE — PROGRESS NOTES
SUBJECTIVE:    Patient ID: Yulisa Castano is a 49 y.o. female.    Chief Complaint: Annual Exam (No bottles//Pt is here for an annual exam//Will schedule pap with NP Wilman//ANTWON)    History of Present Illness    CHIEF COMPLAINT:   annual wellness visit   HPI:  No significant medical history. She stays active. Sleeps ok most nights. Did experience  menstrual irregularities over the past month, including a delayed period with difficult expulsion. She noted brownish discharge for about a week post-menstruation, followed by mucus-like discharge with brown strands from her vagina during bowel movements. These symptoms are not associated with pain but are accompanied by increased bloating. A few weeks ago, she felt sensitivity in her lower right abdomen, described as internal soreness rather than cramping.    Yulisa has occasional anxiety episodes triggering difficulty breathing, precipitated by thoughts about anxiety or focusing on her breathing. She can self-soothe through deep breathing exercises and denies tachycardia during these episodes.    She reports increased urination, particularly nocturnal, attributed to evening water consumption. She also notes left-sided nasal congestion, occasionally impeding nasal breathing.    Yulisa had a Pap smear last year, which initially raised concern due to abdominal hardness and bloating. A subsequent ultrasound was performed that was normal She reports a history of kidney stones.    Yulisa denies dyspareunia, exertional dyspnea, or morning fatigue. She denies allergies or use of nasal sprays.    MEDICAL HISTORY:  Yulisa has a history of kidney stones and shingles. She was diagnosed with diverticulosis during a colonoscopy last year. Yulisa's last menstrual period (LMP) is unknown. She reports being perimenopausal and has not yet reached menopause. Her last Pap smear was performed last year with normal results. Yulisa is sexually active.    SURGICAL HISTORY:  Yulisa underwent a  colonoscopy last year. During the procedure, polyps were found and removed, with benign results.    TEST RESULTS:  Two days ago, the patient had comprehensive labs. Her blood count showed normal levels of white blood cells, RBCs, hemoglobin, and hematocrit. Anemia indicators (MCV, MCH, MCHC, RDW) were within normal limits. The white blood cell differential was also within normal limits. Her Comprehensive Metabolic Panel revealed a fasting blood sugar of 85 (normal <100). Kidney function tests showed a filtration rate of 106 (normal >60). Electrolytes, including sodium and potassium, were normal. Liver enzymes were within normal limits. The cholesterol panel showed improvement with total cholesterol at 187 (down from 214 a year ago), HDL at 69, LDL at 96 (down from 122), and triglycerides <150. Thyroid function test revealed a TSH of 2.0 (normal range 0.4-4.5). Urinalysis showed some bacteria and epithelial cells present but did not reflex to culture. Yulisa underwent a colonoscopy last year, which revealed some benign polyps and an incidental finding of diverticulosis. A Pap smear was also performed last year with normal results.    IMAGING:  An abdominal ultrasound was performed last year with normal findings. Yulisa also had a kidney ultrasound in 2021, w  hich was performed by a urologist.    SOCIAL HISTORY:  Yulisa drinks coffee. She is currently employed and .    ROS:  General: -fever, -chills, -fatigue, -weight gain, -weight loss  Eyes: -vision changes, -redness, -discharge  ENT: -ear pain, +nasal congestion, -sore throat  Cardiovascular: -chest pain, -palpitations, -lower extremity edema  Respiratory: -cough, +shortness of breath  Gastrointestinal: -abdominal pain, -nausea, -vomiting, -diarrhea, -constipation, -blood in stool, +bloating  Genitourinary: -dysuria, -hematuria, -frequency  Musculoskeletal: -joint pain, -muscle pain  Skin: -rash, -lesion  Neurological: -headache, -dizziness, -numbness,  -tingling  Psychiatric: +anxiety, -depression, -sleep difficulty         Telephone on 10/15/2024   Component Date Value Ref Range Status    WBC 11/05/2024 5.8  3.8 - 10.8 Thousand/uL Final    RBC 11/05/2024 4.06  3.80 - 5.10 Million/uL Final    Hemoglobin 11/05/2024 12.8  11.7 - 15.5 g/dL Final    Hematocrit 11/05/2024 40.0  35.0 - 45.0 % Final    MCV 11/05/2024 98.5  80.0 - 100.0 fL Final    MCH 11/05/2024 31.5  27.0 - 33.0 pg Final    MCHC 11/05/2024 32.0  32.0 - 36.0 g/dL Final    RDW 11/05/2024 12.7  11.0 - 15.0 % Final    Platelets 11/05/2024 271  140 - 400 Thousand/uL Final    MPV 11/05/2024 10.1  7.5 - 12.5 fL Final    Neutrophils, Abs 11/05/2024 2,587  1,500 - 7,800 cells/uL Final    Lymph # 11/05/2024 2,749  850 - 3,900 cells/uL Final    Mono # 11/05/2024 290  200 - 950 cells/uL Final    Eos # 11/05/2024 122  15 - 500 cells/uL Final    Baso # 11/05/2024 52  0 - 200 cells/uL Final    Neutrophils Relative 11/05/2024 44.6  % Final    Lymph % 11/05/2024 47.4  % Final    Mono % 11/05/2024 5.0  % Final    Eosinophil % 11/05/2024 2.1  % Final    Basophil % 11/05/2024 0.9  % Final    Glucose 11/05/2024 85  65 - 99 mg/dL Final    BUN 11/05/2024 11  7 - 25 mg/dL Final    Creatinine 11/05/2024 0.70  0.50 - 0.99 mg/dL Final    eGFR 11/05/2024 106  > OR = 60 mL/min/1.73m2 Final    BUN/Creatinine Ratio 11/05/2024 SEE NOTE:  6 - 22 (calc) Final    Sodium 11/05/2024 138  135 - 146 mmol/L Final    Potassium 11/05/2024 4.5  3.5 - 5.3 mmol/L Final    Chloride 11/05/2024 102  98 - 110 mmol/L Final    CO2 11/05/2024 28  20 - 32 mmol/L Final    Calcium 11/05/2024 9.4  8.6 - 10.2 mg/dL Final    Total Protein 11/05/2024 7.4  6.1 - 8.1 g/dL Final    Albumin 11/05/2024 4.7  3.6 - 5.1 g/dL Final    Globulin, Total 11/05/2024 2.7  1.9 - 3.7 g/dL (calc) Final    Albumin/Globulin Ratio 11/05/2024 1.7  1.0 - 2.5 (calc) Final    Total Bilirubin 11/05/2024 0.5  0.2 - 1.2 mg/dL Final    Alkaline Phosphatase 11/05/2024 38  31 - 125 U/L Final     AST 11/05/2024 17  10 - 35 U/L Final    ALT 11/05/2024 14  6 - 29 U/L Final    Cholesterol 11/05/2024 187  <200 mg/dL Final    HDL 11/05/2024 69  > OR = 50 mg/dL Final    Triglycerides 11/05/2024 121  <150 mg/dL Final    LDL Cholesterol 11/05/2024 96  mg/dL (calc) Final    HDL/Cholesterol Ratio 11/05/2024 2.7  <5.0 (calc) Final    Non HDL Chol. (LDL+VLDL) 11/05/2024 118  <130 mg/dL (calc) Final    TSH w/reflex to FT4 11/05/2024 2.09  mIU/L Final    Color, UA 11/05/2024 YELLOW  YELLOW Final    Appearance, UA 11/05/2024 CLEAR  CLEAR Final    Specific [place], UA 11/05/2024 1.011  1.001 - 1.035 Final    pH, UA 11/05/2024 7.0  5.0 - 8.0 Final    Glucose, UA 11/05/2024 NEGATIVE  NEGATIVE Final    Bilirubin, UA 11/05/2024 NEGATIVE  NEGATIVE Final    Ketones, UA 11/05/2024 NEGATIVE  NEGATIVE Final    Occult Blood UA 11/05/2024 NEGATIVE  NEGATIVE Final    Protein, UA 11/05/2024 NEGATIVE  NEGATIVE Final    Nitrite, UA 11/05/2024 NEGATIVE  NEGATIVE Final    Leukocytes, UA 11/05/2024 NEGATIVE  NEGATIVE Final    WBC Casts, UA 11/05/2024 NONE SEEN  < OR = 5 /HPF Final    RBC Casts, UA 11/05/2024 0-2  < OR = 2 /HPF Final    Squam Epithel, UA 11/05/2024 6-10 (A)  < OR = 5 /HPF Final    Bacteria, UA 11/05/2024 FEW (A)  NONE SEEN /HPF Final    Hyaline Casts, UA 11/05/2024 NONE SEEN  NONE SEEN /LPF Final    Service Cmt: 11/05/2024 SEE COMMENT   Final    Reflexive Urine Culture 11/05/2024 SEE COMMENT   Final       Past Medical History:   Diagnosis Date    History of colonoscopy 07/14/2023    Kidney stone     Personal history of colonic polyps 07/14/2023     Social History     Socioeconomic History    Marital status:    Occupational History    Occupation: human resources   Tobacco Use    Smoking status: Never    Smokeless tobacco: Never   Substance and Sexual Activity    Alcohol use: Yes     Comment: once a week    Drug use: No    Sexual activity: Yes     Birth control/protection: Other-see comments     Comment: Tubal - 2011  "  Social History Narrative    Sleeps pretty good.      Social Drivers of Health     Financial Resource Strain: Low Risk  (2024)    Overall Financial Resource Strain (CARDIA)     Difficulty of Paying Living Expenses: Not hard at all   Food Insecurity: No Food Insecurity (2024)    Hunger Vital Sign     Worried About Running Out of Food in the Last Year: Never true     Ran Out of Food in the Last Year: Never true   Physical Activity: Insufficiently Active (2024)    Exercise Vital Sign     Days of Exercise per Week: 1 day     Minutes of Exercise per Session: 20 min   Stress: No Stress Concern Present (2024)    [employer] - Occupational Stress Questionnaire     Feeling of Stress : Only a little   Housing Stability: Unknown (2024)    Housing Stability Vital Sign     Unable to Pay for Housing in the Last Year: No     Past Surgical History:   Procedure Laterality Date    AUGMENTATION OF BREAST Bilateral     2002    BREAST SURGERY       SECTION      COLONOSCOPY  2023    RHINOPLASTY      TUBAL LIGATION       Family History   Problem Relation Name Age of Onset    Hypertension Mother      No Known Problems Father      Diabetes Paternal Grandmother         Tests to Keep You Healthy    Mammogram: Met on 2024  Colon Cancer Screening: Met on 2023  Cervical Cancer Screening: DUE      Review of patient's allergies indicates:  No Known Allergies    Current Outpatient Medications:     biotin 10,000 mcg Cap, Take by mouth once daily., Disp: , Rfl:     COLLAGEN MISC, by Misc.(Non-Drug; Combo Route) route., Disp: , Rfl:     multivitamin (THERAGRAN) per tablet, Take 1 tablet by mouth once daily., Disp: , Rfl:     radiacream (VANICREAM), Apply 1-2 times a day for 2 wks prior to and 2 wks after laser procedure, Disp: 30 g, Rfl: 2    Objective:      Vitals:    24 0952   BP: 110/72   Pulse: 75   SpO2: 100%   Weight: 51.7 kg (114 lb)   Height: 5' 1" (1.549 m) "     Physical Exam    General: No acute distress. Well-developed. Well-nourished.  HENT: Normocephalic. Atraumatic. Nares patent. Moist oral mucosa. Mild swelling in left side of nose. All normal.  Ears: Bilateral TMs clear. Bilateral EACs clear.  Cardiovascular: Regular rate. Regular rhythm. No murmurs. No rubs. No gallops. Normal S1, S2.  Respiratory: Normal respiratory effort. Clear to auscultation bilaterally. No rales. No rhonchi. No wheezing.  Abdomen: Soft. Minimal tenderness in lower right quadrant of abdomen. Non-distended. Normoactive bowel sounds.  Musculoskeletal: No  obvious deformity.  Extremities: No lower extremity edema.  Neurological: Alert & oriented x3. No slurred speech. Normal gait.  Psychiatric: Normal mood. Normal affect. Good insight. Good judgment.  Skin: Warm. Dry. No rash.       Assessment:       Assessment & Plan    - Assessed irregular menstrual cycle and vaginal discharge, likely related to perimenopause  - Evaluated recent lab results, noting improvement in cholesterol levels and normal thyroid function  - Considered mild anxiety symptoms, but determined medication unnecessary at this time  - Identified nasal congestion, potentially contributing to reported breathing difficulties  - Reviewed prior colonoscopy results, noting benign polyps and incidental finding of diverticulosis  - Considered ultrasound for ongoing vaginal bleeding/discharge if symptoms persist    DIVERTICULOSIS:  Explained diverticulosis as common outpouchings in the colon, typically asymptomatic.    PERIMENOPAUSE:  Discussed perimenopausal changes, including irregular menstrual cycles and hormonal fluctuations.  Provided information on average age of menopause onset (50 years, +/- 5 years).  Explained that dark vaginal discharge likely represents old blood from incomplete menstrual shedding.  Contact the office if vaginal bleeding/discharge continues or worsens.    MEDICATIONS/SUPPLEMENTS:  Started nasal spray: 2  sprays before bed for 1 month.    FLU VACCINATION:  Administered flu vaccine in office.    FOLLOW UP:  Follow up to fax completed work form to patient's employer.       Plan:       Need for influenza vaccination  Comments:  flu vaccine given  Orders:  -     influenza (Flulaval, Fluzone, Fluarix) 45 mcg/0.5 mL IM vaccine (> or = 6 mo) 0.5 mL    Wellness examination  Comments:  doing well    Perimenopause  Comments:  plans to see gyn      Follow up in about 1 year (around 11/7/2025), or if symptoms worsen or fail to improve, for Annual Physical.        This note was generated with the assistance of ambient listening technology. Verbal consent was obtained by the patient and accompanying visitor(s) for the recording of patient appointment to facilitate this note. I attest to having reviewed and edited the generated note for accuracy, though some syntax or spelling errors may persist. Please contact the author of this note for any clarification.      11/10/2024 Radha Souza      Answers submitted by the patient for this visit:  Review of Systems Questionnaire (Submitted on 11/6/2024)  activity change: No  unexpected weight change: No  neck pain: No  hearing loss: No  rhinorrhea: No  trouble swallowing: No  eye discharge: No  visual disturbance: No  chest tightness: No  wheezing: No  chest pain: No  palpitations: No  blood in stool: No  constipation: No  vomiting: No  diarrhea: No  polydipsia: No  polyuria: Yes  difficulty urinating: No  hematuria: No  menstrual problem: Yes  dysuria: No  joint swelling: No  arthralgias: No  headaches: No  weakness: No  confusion: No  dysphoric mood: No

## 2024-11-11 ENCOUNTER — TELEPHONE (OUTPATIENT)
Dept: FAMILY MEDICINE | Facility: CLINIC | Age: 49
End: 2024-11-11
Payer: COMMERCIAL

## 2024-11-11 NOTE — LETTER
AUTHORIZATION FOR RELEASE OF   CONFIDENTIAL INFORMATION      We are seeing Yulisa Castano, date of birth 1975, in the clinic at [facility]. Radha Souza NP is the patient's PCP. Yulisa Castano has an outstanding lab/procedure at the time we reviewed her chart. In order to help keep her health information updated, she has authorized us to request the following medical record(s):        (  )  MAMMOGRAM                                      (  )  COLONOSCOPY      ( X )  PAP SMEAR                                          (  )  OUTSIDE LAB RESULTS     (  )  DEXA SCAN                                          (  )  EYE EXAM            (  )  FOOT EXAM                                          (  )  ENTIRE RECORD     (  )  OUTSIDE IMMUNIZATIONS                 (  )  _______________         Please fax records to [facility], Powell, Jodi, NP, [phone]     If you have any questions, please contact Devorah at [phone].           Patient Name: Yulisa Castano  : 1975  Patient Phone #: [phone]

## 2024-11-27 RX ORDER — FLUTICASONE PROPIONATE 50 MCG
1 SPRAY, SUSPENSION (ML) NASAL DAILY
Qty: 15.8 ML | Refills: 5 | Status: SHIPPED | OUTPATIENT
Start: 2024-11-27

## 2024-11-27 NOTE — TELEPHONE ENCOUNTER
----- Message from Arlen sent at 11/27/2024  3:54 PM CST -----  RX for flonase. [facility] [address].  Pt #[phone]

## 2024-12-09 ENCOUNTER — TELEPHONE (OUTPATIENT)
Dept: UROLOGY | Facility: CLINIC | Age: 49
End: 2024-12-09
Payer: COMMERCIAL

## 2024-12-09 NOTE — TELEPHONE ENCOUNTER
----- Message from Lucysesar sent at 12/9/2024  7:52 AM CST -----  Type:  Same Day Appointment Request    Caller is requesting a same day appointment.  Caller declined first available appointment listed below.      Name of Caller:  Pt    When is the first available appointment?   12/10/24    Symptoms:   possible UTI    Best Call Back Number:  [phone]    Additional Information:  Pt is trying to get in today with someone to get in today.  Please call back to advise. Thanks!

## 2024-12-10 ENCOUNTER — OFFICE VISIT (OUTPATIENT)
Dept: UROLOGY | Facility: CLINIC | Age: 49
End: 2024-12-10
Payer: COMMERCIAL

## 2024-12-10 DIAGNOSIS — R30.0 DYSURIA: ICD-10-CM

## 2024-12-10 DIAGNOSIS — R39.9 UTI SYMPTOMS: ICD-10-CM

## 2024-12-10 DIAGNOSIS — R31.0 GROSS HEMATURIA: Primary | ICD-10-CM

## 2024-12-10 PROCEDURE — 88112 CYTOPATH CELL ENHANCE TECH: CPT | Performed by: STUDENT IN AN ORGANIZED HEALTH CARE EDUCATION/TRAINING PROGRAM

## 2024-12-10 PROCEDURE — 99999 PR PBB SHADOW E&M-EST. PATIENT-LVL III: CPT | Mod: PBBFAC,,, | Performed by: NURSE PRACTITIONER

## 2024-12-10 PROCEDURE — 87086 URINE CULTURE/COLONY COUNT: CPT | Performed by: NURSE PRACTITIONER

## 2024-12-10 PROCEDURE — 1159F MED LIST DOCD IN RCRD: CPT | Mod: CPTII,S$GLB,, | Performed by: NURSE PRACTITIONER

## 2024-12-10 PROCEDURE — 1160F RVW MEDS BY RX/DR IN RCRD: CPT | Mod: CPTII,S$GLB,, | Performed by: NURSE PRACTITIONER

## 2024-12-10 PROCEDURE — 99214 OFFICE O/P EST MOD 30 MIN: CPT | Mod: S$GLB,,, | Performed by: NURSE PRACTITIONER

## 2024-12-10 RX ORDER — AMOXICILLIN AND CLAVULANATE POTASSIUM 500; 125 MG/1; MG/1
1 TABLET, FILM COATED ORAL 2 TIMES DAILY
Qty: 14 TABLET | Refills: 0 | Status: SHIPPED | OUTPATIENT
Start: 2024-12-10 | End: 2024-12-17

## 2024-12-10 RX ORDER — TAMSULOSIN HYDROCHLORIDE 0.4 MG/1
0.4 CAPSULE ORAL DAILY
Qty: 30 CAPSULE | Refills: 1 | Status: SHIPPED | OUTPATIENT
Start: 2024-12-10 | End: 2025-01-09

## 2024-12-10 NOTE — PROGRESS NOTES
"[facility] Note  Staff: TERE FongC    PCP: ANGELO Souza    Chief Complaint: Dysuria, Gross Hematuria    Subjective:        HPI: Yulisa Castano is a 49 y.o. female presents today with c/o dysuria and gross hematuria x one week.    Pt was last evaluated by me on 21 for kidney stones.    OV 21:  Pt evaluated for stones and flank pains.  No gross hematuria  LMP:  11/15/21  Mother had hx of kidney stones.  Recently, pt last week c/o lower back pains more prominently on her right side.  Pt states hx of kidney stones.    OV 12/10/24:  Gross Hematuria and Dysuria x one week.  Pt has been taking "leftover" Amoxicillin since last week intermittently.  She states today the pain and blood will go away only while on the antibiotics.  Last dose was the past 12-24 hrs.  History of Kidney Stones?:  Yes, mother also has kidney stones  No Family Hx of  Cancers noted by pt.    REVIEW OF SYSTEMS:  A comprehensive 10 system review was performed and is negative except as noted above in HPI    PMHx:  Past Medical History:   Diagnosis Date    History of colonoscopy 2023    Kidney stone     Personal history of colonic polyps 2023     PSHx:  Past Surgical History:   Procedure Laterality Date    AUGMENTATION OF BREAST Bilateral     2002    BREAST SURGERY       SECTION      COLONOSCOPY  2023    RHINOPLASTY      TUBAL LIGATION         Allergies:  Patient has no known allergies.    Medications: reviewed   Objective:   There were no vitals filed for this visit.    Physical Exam  Constitutional:       Appearance: She is well-developed.   HENT:      Head: Normocephalic and atraumatic.   Eyes:      Conjunctiva/sclera: Conjunctivae normal.      Pupils: Pupils are equal, round, and reactive to light.   Cardiovascular:      Rate and Rhythm: Normal rate and regular rhythm.      Heart sounds: Normal heart sounds.   Pulmonary:      Effort: Pulmonary effort is normal.      Breath " sounds: Normal breath sounds.   Abdominal:      General: Bowel sounds are normal.      Palpations: Abdomen is soft.   Musculoskeletal:         General: Normal range of motion.      Cervical back: Normal range of motion and neck supple.   Skin:     General: Skin is warm and dry.   Neurological:      Mental Status: She is alert and oriented to person, place, and time.      Deep Tendon Reflexes: Reflexes are normal and symmetric.   Psychiatric:         Behavior: Behavior normal.         Thought Content: Thought content normal.         Judgment: Judgment normal.           Assessment:       1. Gross hematuria    2. UTI symptoms    3. Dysuria          Plan:   Dysuria, Gross Hematuria:    Urine Culture and Cytology to be processed today.  CTU with and without contrast with serum cr to be scheduled for further evaluation at this time.  In the meantime I have prescribed the following:  Augmentin 500-125 mg BID and Tamsulosin 0.4 mg daily at this time for UTI vs. Possible kidney stone.  Urine strainer and specimen cups were given to pt on conclusion of ov today.    F/u We will contact pt regarding further poc after we receive all urine, labs and imaging results for best recommendations.    [facility]: Active    Ethel Capps, BRENDA

## 2024-12-11 LAB
BACTERIA UR CULT: NO GROWTH
FINAL PATHOLOGIC DIAGNOSIS: NORMAL
Lab: NORMAL

## 2024-12-12 ENCOUNTER — TELEPHONE (OUTPATIENT)
Dept: UROLOGY | Facility: CLINIC | Age: 49
End: 2024-12-12
Payer: COMMERCIAL

## 2024-12-12 NOTE — TELEPHONE ENCOUNTER
----- Message from LATA Fong sent at 12/12/2024  7:59 AM CST -----  Regarding: Pt requesting to do CTU next year  Contact: self  That is fine, pt would need to reschedule then per her request if she scheduled CTU to be done prior to next year.    Thanks.  ----- Message -----  From: Lelia Butcher  Sent: 12/11/2024  11:22 AM CST  To: LATA Young      ----- Message -----  From: Noemy Malcolm  Sent: 12/11/2024  11:20 AM CST  To: Jefry DOWLING Staff    Type: Needs Medical Advice  Who Called:  pt  Best Call Back Number: [phone]   Additional Information: pt would like to know if she can do the CT UROGRAM [[id number]] next year so that it can go towards her deductible or does it need to be done this year.please call

## 2024-12-13 ENCOUNTER — TELEPHONE (OUTPATIENT)
Dept: FAMILY MEDICINE | Facility: CLINIC | Age: 49
End: 2024-12-13
Payer: COMMERCIAL

## 2024-12-13 NOTE — TELEPHONE ENCOUNTER
Luisa Stovall, ENMANUEL, NP-C   Was planning on scheduling pap with above provider at recent visit? Was this done?

## 2024-12-13 NOTE — LETTER
AUTHORIZATION FOR RELEASE OF   CONFIDENTIAL INFORMATION        We are seeing Yulisa Csatano, date of birth 1975, in the clinic at [facility]. Radha Souza NP is the patient's PCP. Yulisa Castano has an outstanding lab/procedure at the time we reviewed her chart. In order to help keep her health information updated, she has authorized us to request the following medical record(s):        (  )  MAMMOGRAM                                      (  )  COLONOSCOPY      ( X  )  PAP SMEAR                                          (  )  OUTSIDE LAB RESULTS     (  )  DEXA SCAN                                          (  )  EYE EXAM            (  )  FOOT EXAM                                          (  )  ENTIRE RECORD     (  )  OUTSIDE IMMUNIZATIONS                 (  )  _______________         Please fax records to [facility], Powell, Jodi, NP, [phone]     If you have any questions, please contact Devorah at [phone] .           Patient Name: Yulisa Castano  : 1975  Patient Phone #: [phone]

## 2024-12-26 ENCOUNTER — TELEPHONE (OUTPATIENT)
Dept: FAMILY MEDICINE | Facility: CLINIC | Age: 49
End: 2024-12-26
Payer: COMMERCIAL

## 2024-12-26 RX ORDER — TOBRAMYCIN 3 MG/ML
1 SOLUTION/ DROPS OPHTHALMIC EVERY 6 HOURS
Qty: 5 ML | Refills: 0 | Status: SHIPPED | OUTPATIENT
Start: 2024-12-26 | End: 2024-12-26 | Stop reason: SDUPTHER

## 2024-12-26 RX ORDER — TOBRAMYCIN 3 MG/ML
1 SOLUTION/ DROPS OPHTHALMIC EVERY 6 HOURS
Qty: 5 ML | Refills: 0 | Status: SHIPPED | OUTPATIENT
Start: 2024-12-26 | End: 2024-12-31

## 2024-12-26 NOTE — TELEPHONE ENCOUNTER
----- Message from Sameera sent at 12/26/2024  8:59 AM CST -----  Pt would like a prescription for pink eye to be sent to [facility] on [address].    [phone]

## 2024-12-26 NOTE — TELEPHONE ENCOUNTER
----- Message from Med Assistant Lira sent at 12/26/2024  9:34 AM CST -----  Pt is calling [employer] back pt states she is unable to upload picture also when she take picture on her phone its not really showing pink eye    Pt # [phone]

## 2024-12-26 NOTE — TELEPHONE ENCOUNTER
Patient and  are asking for treatment for pink eye - no other sx. Not able to upload a picture to the portal.

## 2025-01-04 ENCOUNTER — OFFICE VISIT (OUTPATIENT)
Dept: URGENT CARE | Facility: CLINIC | Age: 50
End: 2025-01-04
Payer: COMMERCIAL

## 2025-01-04 ENCOUNTER — NURSE TRIAGE (OUTPATIENT)
Dept: ADMINISTRATIVE | Facility: CLINIC | Age: 50
End: 2025-01-04
Payer: COMMERCIAL

## 2025-01-04 ENCOUNTER — OCHSNER VIRTUAL EMERGENCY DEPARTMENT (OUTPATIENT)
Facility: CLINIC | Age: 50
End: 2025-01-04
Payer: COMMERCIAL

## 2025-01-04 VITALS
BODY MASS INDEX: 21.71 KG/M2 | HEIGHT: 61 IN | HEART RATE: 83 BPM | TEMPERATURE: 98 F | RESPIRATION RATE: 15 BRPM | WEIGHT: 115 LBS | DIASTOLIC BLOOD PRESSURE: 80 MMHG | OXYGEN SATURATION: 100 % | SYSTOLIC BLOOD PRESSURE: 125 MMHG

## 2025-01-04 DIAGNOSIS — R39.9 UTI SYMPTOMS: ICD-10-CM

## 2025-01-04 DIAGNOSIS — R31.9 HEMATURIA, UNSPECIFIED TYPE: Primary | ICD-10-CM

## 2025-01-04 LAB
BILIRUB UR QL STRIP: NEGATIVE
GLUCOSE UR QL STRIP: NEGATIVE
KETONES UR QL STRIP: NEGATIVE
LEUKOCYTE ESTERASE UR QL STRIP: NEGATIVE
PH, POC UA: 6
POC BLOOD, URINE: POSITIVE
POC NITRATES, URINE: NEGATIVE
PROT UR QL STRIP: POSITIVE
SP GR UR STRIP: 1.01 (ref 1–1.03)
UROBILINOGEN UR STRIP-ACNC: 0.2 (ref 0.1–1.1)

## 2025-01-04 RX ORDER — PHENAZOPYRIDINE HYDROCHLORIDE 100 MG/1
100 TABLET, FILM COATED ORAL 3 TIMES DAILY PRN
Qty: 30 TABLET | Refills: 0 | Status: SHIPPED | OUTPATIENT
Start: 2025-01-04 | End: 2025-01-14

## 2025-01-04 RX ORDER — TAMSULOSIN HYDROCHLORIDE 0.4 MG/1
0.4 CAPSULE ORAL DAILY
Qty: 30 CAPSULE | Refills: 0 | Status: SHIPPED | OUTPATIENT
Start: 2025-01-04

## 2025-01-04 RX ORDER — CEPHALEXIN 500 MG/1
500 CAPSULE ORAL EVERY 12 HOURS
Qty: 14 CAPSULE | Refills: 0 | Status: SHIPPED | OUTPATIENT
Start: 2025-01-04 | End: 2025-01-04

## 2025-01-04 RX ORDER — CEPHALEXIN 500 MG/1
500 CAPSULE ORAL EVERY 12 HOURS
Qty: 14 CAPSULE | Refills: 0 | Status: SHIPPED | OUTPATIENT
Start: 2025-01-04 | End: 2025-01-11

## 2025-01-04 NOTE — PATIENT INSTRUCTIONS
Keflex twice a day for 7 days    Flomax once a day as before until told otherwise    Pyridium up to 3 times a day as needed for bladder symptoms    Increase your fluid intake significantly to help flush urinary system and maintain diluted blood so that blood clots do not form  Please call your urologist office on Monday and inform them of return of symptoms and current treatment plan in play

## 2025-01-04 NOTE — TELEPHONE ENCOUNTER
Pt reports she was dx with UTI on 12/10/24 was put on antibiotics which she finished 2 weeks ago. Pt reports urinary urgency, hematuria, burning with urination, and urinary hesitancy that began. Pt denies lower back pain/flank pain, passing pure blood, body aches, fever. Care advice to see PCP within 24 hours. No available appointments. Secure chat sent to Dulce Hou MD verbalized [place] [facility] made aware and verbalizes understanding.   Reason for Disposition   Pain or burning with passing urine    Additional Information   Negative: Shock suspected (e.g., cold/pale/clammy skin, too weak to stand, low BP, rapid pulse)   Negative: Sounds like a life-threatening emergency to the triager   Negative: Recent back or abdominal injury   Negative: Recent genital injury   Negative: [1] Unable to urinate (or only a few drops) > 4 hours AND [2] bladder feels very full (e.g., palpable bladder or strong urge to urinate)   Negative: [1] Diffuse (all over) muscle pains in the shoulders, arms, legs, and back AND [2] dark (cola or tea-colored) or red-colored urine   Negative: Passing pure blood or large blood clots (i.e., size > a dime)  (Exception: Jose Maria or small strands.)   Negative: Patient sounds very sick or weak to the triager   Negative: Fever > 100.4 F (38.0 C)   Negative: [1] Pain or burning with passing urine AND [2] side (flank) or back pain present   Negative: Known sickle cell disease   Negative: Taking Coumadin (warfarin) or other strong blood thinner, or known bleeding disorder (e.g., thrombocytopenia)    Protocols used: Urine - Blood In-A-

## 2025-01-04 NOTE — PROGRESS NOTES
"Subjective:      Patient ID: Yulisa Castano is a 49 y.o. female.    Vitals:  height is 5' 1" (1.549 m) and weight is 52.2 kg (115 lb). Her oral temperature is 98.3 °F (36.8 °C). Her blood pressure is 125/80 and her pulse is 83. Her respiration is 15 and oxygen saturation is 100%.     Chief Complaint: Urinary Tract Infection    Has appointment for cystoscopy on this upcoming Wednesday 4 days from now      Urinary Tract Infection   This is a recurrent problem. The current episode started yesterday. The problem occurs every urination. The problem has been unchanged. The quality of the pain is described as burning. The pain is at a severity of 7/10. The pain is severe. There has been no fever. She is Sexually active. There is No history of pyelonephritis. Associated symptoms include frequency, hematuria and urgency. Pertinent negatives include no chills, flank pain, nausea or vomiting. She has tried increased fluids for the symptoms. The treatment provided mild relief.       Constitution: Negative for chills and fever.   Gastrointestinal:  Negative for abdominal pain, nausea and vomiting.   Genitourinary:  Positive for dysuria, frequency, urgency and hematuria. Negative for flank pain and pelvic pain.   Musculoskeletal:  Negative for back pain.      Objective:     Physical Exam   Constitutional: She is oriented to person, place, and time.  Non-toxic appearance. She does not appear ill. No distress.   HENT:   Head: Normocephalic and atraumatic.   Nose: Nose normal.   Mouth/Throat: Oropharynx is clear.   Eyes: Conjunctivae are normal.   Cardiovascular: Normal rate.   Pulmonary/Chest: Effort normal. No respiratory distress.   Abdominal: Normal appearance.   Neurological: no focal deficit. She is alert and oriented to person, place, and time.   Skin: Skin is warm, dry and no rash. Capillary refill takes 2 to 3 seconds.   Psychiatric: Her behavior is normal. Mood normal.   Nursing note and vitals reviewed.      Assessment: "     1. Hematuria, unspecified type    2. UTI symptoms      UA: neg wbc's, neg nitrites, 200 rbc's.   Urine culture pending        Plan:       Hematuria, unspecified type  -     POCT Urinalysis, Dipstick, Automated, W/O Scope    UTI symptoms  -     Urine Culture High Risk  -     cephALEXin (KEFLEX) 500 MG capsule; Take 1 capsule (500 mg total) by mouth every 12 (twelve) hours. for 7 days  Dispense: 14 capsule; Refill: 0  -     tamsulosin (FLOMAX) 0.4 mg Cap; Take 1 capsule (0.4 mg total) by mouth once daily.  Dispense: 30 capsule; Refill: 0  -     phenazopyridine (PYRIDIUM) 100 MG tablet; Take 1 tablet (100 mg total) by mouth 3 (three) times daily as needed (Bladder symptoms).  Dispense: 30 tablet; Refill: 0                  Encourage patient to call her urologist on Monday and inform of return of symptoms and current treatment plan.  Patient states will verbalizes understanding

## 2025-01-04 NOTE — PLAN OF CARE-OVED
[facility] Plan of Care Note    Referral source: Nurse On-Call      Reason for consult: UTI      Additional History: Pt reports she completed a course of antibiotics 2 weeks ago that she was given for a UTI. However, she now has urinary urgency, hematuria, burning with urination, and urinary hesitancy.  She denies lower back pain/flank pain, passing pure blood, body aches, fever.       Disposition recommended: Urgent Care      Additional Recommendations: Chart reviewed, no UA or recent Urine culture results to review. Will need UA / Ucx

## 2025-01-06 ENCOUNTER — TELEPHONE (OUTPATIENT)
Dept: UROLOGY | Facility: CLINIC | Age: 50
End: 2025-01-06
Payer: COMMERCIAL

## 2025-01-06 NOTE — TELEPHONE ENCOUNTER
Spoke with patient, informed to keep appt to have CT done, the UTI will not affect anything, patient verbally understood.

## 2025-01-06 NOTE — TELEPHONE ENCOUNTER
----- Message from Mar sent at 1/6/2025 10:08 AM CST -----   Type:  Needs Medical Advice    Who Called: PT    Would the patient rather a call back or a response via [facility]? Call  Best Call Back Number: [phone]        Additional Information: states she went to ER and have UTI again wants to know if she need to cancel 1/8/25 appt or keep it. Please call back to advise. Thank you!

## 2025-01-08 ENCOUNTER — HOSPITAL ENCOUNTER (OUTPATIENT)
Dept: RADIOLOGY | Facility: HOSPITAL | Age: 50
Discharge: HOME | End: 2025-01-08
Attending: NURSE PRACTITIONER
Payer: COMMERCIAL

## 2025-01-08 DIAGNOSIS — R39.9 UTI SYMPTOMS: ICD-10-CM

## 2025-01-08 DIAGNOSIS — R31.0 GROSS HEMATURIA: ICD-10-CM

## 2025-01-08 LAB
BACTERIA UR CULT: NO GROWTH
BACTERIA UR CULT: NORMAL

## 2025-01-08 PROCEDURE — 25500020 PHARM REV CODE 255

## 2025-01-08 PROCEDURE — 74178 CT ABD&PLV WO CNTR FLWD CNTR: CPT | Mod: 26,,, | Performed by: RADIOLOGY

## 2025-01-08 PROCEDURE — 74178 CT ABD&PLV WO CNTR FLWD CNTR: CPT | Mod: TC

## 2025-01-08 RX ADMIN — IOHEXOL 125 ML: 350 INJECTION, SOLUTION INTRAVENOUS at 09:01

## 2025-01-13 ENCOUNTER — HOSPITAL ENCOUNTER (OUTPATIENT)
Dept: RADIOLOGY | Facility: HOSPITAL | Age: 50
Discharge: HOME | End: 2025-01-13
Attending: UROLOGY
Payer: COMMERCIAL

## 2025-01-13 DIAGNOSIS — N20.0 KIDNEY STONE: ICD-10-CM

## 2025-01-13 PROCEDURE — 74018 RADEX ABDOMEN 1 VIEW: CPT | Mod: 26,,, | Performed by: RADIOLOGY

## 2025-01-13 PROCEDURE — 74018 RADEX ABDOMEN 1 VIEW: CPT | Mod: TC,PO

## 2025-01-16 ENCOUNTER — PATIENT OUTREACH (OUTPATIENT)
Dept: ADMINISTRATIVE | Facility: HOSPITAL | Age: 50
End: 2025-01-16
Payer: COMMERCIAL

## 2025-01-20 ENCOUNTER — PATIENT MESSAGE (OUTPATIENT)
Dept: UROLOGY | Facility: CLINIC | Age: 50
End: 2025-01-20
Payer: COMMERCIAL

## 2025-01-24 ENCOUNTER — OFFICE VISIT (OUTPATIENT)
Dept: UROLOGY | Facility: CLINIC | Age: 50
End: 2025-01-24
Payer: COMMERCIAL

## 2025-01-24 DIAGNOSIS — N20.0 RENAL CALCULUS, RIGHT: ICD-10-CM

## 2025-01-24 DIAGNOSIS — R31.0 GROSS HEMATURIA: Primary | ICD-10-CM

## 2025-01-24 PROCEDURE — 98007 SYNCH AUDIO-VIDEO EST HI 40: CPT | Mod: 95,,, | Performed by: UROLOGY
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
## 2025-01-24 NOTE — PROGRESS NOTES
Procedure Order to Urology [[id number]]    Electronically signed by: Salty Walter MD on 01/24/25 8042 Status: Active   Ordering user: Salty Walter MD 01/24/25 3830 Authorized by: Salty Walter MD   Ordering mode: Standard   Frequency:  01/24/25 -     Diagnoses  Gross hematuria [R31.0]   Questionnaire    Question Answer   Procedure Cystoscopy Comment - 2/11   Facility Name: [facility], Please order Urine POCT without micro . Local sedation (no urine Dr Deal or Dr cruz)

## 2025-02-05 ENCOUNTER — LAB VISIT (OUTPATIENT)
Dept: LAB | Facility: HOSPITAL | Age: 50
End: 2025-02-05
Attending: UROLOGY
Payer: COMMERCIAL

## 2025-02-05 DIAGNOSIS — N20.0 KIDNEY STONE: ICD-10-CM

## 2025-02-05 DIAGNOSIS — R31.9 HEMATURIA, UNSPECIFIED TYPE: ICD-10-CM

## 2025-02-05 DIAGNOSIS — R31.0 GROSS HEMATURIA: ICD-10-CM

## 2025-02-05 LAB
BACTERIA #/AREA URNS HPF: NORMAL /HPF
BILIRUB UR QL STRIP: NEGATIVE
CLARITY UR: CLEAR
COLOR UR: COLORLESS
GLUCOSE UR QL STRIP: NEGATIVE
HGB UR QL STRIP: ABNORMAL
KETONES UR QL STRIP: NEGATIVE
LEUKOCYTE ESTERASE UR QL STRIP: ABNORMAL
MICROSCOPIC COMMENT: NORMAL
NITRITE UR QL STRIP: NEGATIVE
PH UR STRIP: 6 [PH] (ref 5–8)
PROT UR QL STRIP: NEGATIVE
RBC #/AREA URNS HPF: 1 /HPF (ref 0–4)
SP GR UR STRIP: 1.01 (ref 1–1.03)
SQUAMOUS #/AREA URNS HPF: 5 /HPF
URN SPEC COLLECT METH UR: ABNORMAL
UROBILINOGEN UR STRIP-ACNC: NEGATIVE EU/DL
WBC #/AREA URNS HPF: 3 /HPF (ref 0–5)

## 2025-02-05 PROCEDURE — 87086 URINE CULTURE/COLONY COUNT: CPT | Performed by: UROLOGY

## 2025-02-05 PROCEDURE — 87147 CULTURE TYPE IMMUNOLOGIC: CPT | Performed by: UROLOGY

## 2025-02-05 PROCEDURE — 81000 URINALYSIS NONAUTO W/SCOPE: CPT | Performed by: UROLOGY

## 2025-02-06 ENCOUNTER — TELEPHONE (OUTPATIENT)
Dept: UROLOGY | Facility: CLINIC | Age: 50
End: 2025-02-06
Payer: COMMERCIAL

## 2025-02-06 LAB — BACTERIA UR CULT: ABNORMAL

## 2025-02-06 NOTE — TELEPHONE ENCOUNTER
All prior ucx negative  Ua micro negative with rare bacteria 1 rbc 3 wbc  Strep typically contaminant  High colony count however    Given inconsistencies    Would do CATHD urine for culture Monday morning, and await results prior to any abx    Has self treated before and do NOT recommend until cathd urine obtained    And will need to cancel cysto at this time pending results    If uti symptoms at this time, hydrate well, use azo/cranberry etc, pending CATHD urine culture.     Notify asc to depot at this time

## 2025-02-10 ENCOUNTER — CLINICAL SUPPORT (OUTPATIENT)
Dept: UROLOGY | Facility: CLINIC | Age: 50
End: 2025-02-10
Payer: COMMERCIAL

## 2025-02-10 DIAGNOSIS — R31.0 GROSS HEMATURIA: Primary | ICD-10-CM

## 2025-02-10 PROCEDURE — 87086 URINE CULTURE/COLONY COUNT: CPT | Performed by: UROLOGY

## 2025-02-10 PROCEDURE — 99499 UNLISTED E&M SERVICE: CPT | Mod: S$GLB,,, | Performed by: UROLOGY

## 2025-02-10 PROCEDURE — 87147 CULTURE TYPE IMMUNOLOGIC: CPT | Performed by: UROLOGY

## 2025-02-10 PROCEDURE — 99999 PR PBB SHADOW E&M-EST. PATIENT-LVL I: CPT | Mod: PBBFAC,,,

## 2025-02-10 PROCEDURE — 87088 URINE BACTERIA CULTURE: CPT | Performed by: UROLOGY

## 2025-02-10 NOTE — PROGRESS NOTES
PT drape and prepped in sterile fashion   10 [place]ian Cath placed into bladder  40 cc Urine obtained   Cath removed   Specimen prepared for lab

## 2025-02-11 LAB — BACTERIA UR CULT: ABNORMAL

## 2025-02-12 ENCOUNTER — PATIENT MESSAGE (OUTPATIENT)
Dept: UROLOGY | Facility: CLINIC | Age: 50
End: 2025-02-12
Payer: COMMERCIAL

## 2025-02-12 ENCOUNTER — TELEPHONE (OUTPATIENT)
Dept: UROLOGY | Facility: CLINIC | Age: 50
End: 2025-02-12
Payer: COMMERCIAL

## 2025-02-12 RX ORDER — CEFUROXIME AXETIL 250 MG/1
250 TABLET ORAL DAILY
Qty: 90 TABLET | Refills: 0 | Status: SHIPPED | OUTPATIENT
Start: 2025-02-19 | End: 2025-05-20

## 2025-02-12 RX ORDER — CEFUROXIME AXETIL 500 MG/1
500 TABLET ORAL 2 TIMES DAILY
Qty: 14 TABLET | Refills: 0 | Status: SHIPPED | OUTPATIENT
Start: 2025-02-12 | End: 2025-02-19

## 2025-02-12 NOTE — TELEPHONE ENCOUNTER
----- Message from Estela sent at 2/12/2025  7:55 AM CST -----  Contact: Patient  Type:  Needs Medical Advice    Who Called:   Patient    Pharmacy name and phone #:        [id number] [place] - [address]  [address] [address]  Phone: [phone] Fax: [phone]    Would the patient rather a call back or a response via [facility]?   Call back  Best Call Back Number:   [phone]    Additional Information:   States she needs to speak with someone in the office - states she sees her urine test results came back abnormal - states she is still experiencing burning when urinating - states she needs antibiotics called in - please call - thank you

## 2025-02-12 NOTE — TELEPHONE ENCOUNTER
See note to pt  Abx sent x1 week, then lowe dose suppression to start  Case in depot with ASC  Pull to 4/8/25  Set NV one week prior for CATHD ua/ua micro/ucx pls

## 2025-08-13 DIAGNOSIS — Z12.31 OTHER SCREENING MAMMOGRAM: ICD-10-CM
